# Patient Record
Sex: FEMALE | Race: WHITE | NOT HISPANIC OR LATINO | Employment: FULL TIME | URBAN - METROPOLITAN AREA
[De-identification: names, ages, dates, MRNs, and addresses within clinical notes are randomized per-mention and may not be internally consistent; named-entity substitution may affect disease eponyms.]

---

## 2024-09-05 ENCOUNTER — HOSPITAL ENCOUNTER (INPATIENT)
Facility: HOSPITAL | Age: 34
LOS: 2 days | Discharge: HOME HEALTH | DRG: 494 | End: 2024-09-07
Attending: STUDENT IN AN ORGANIZED HEALTH CARE EDUCATION/TRAINING PROGRAM | Admitting: SURGERY
Payer: MEDICAID

## 2024-09-05 ENCOUNTER — ANESTHESIA EVENT (OUTPATIENT)
Dept: SURGERY | Facility: HOSPITAL | Age: 34
End: 2024-09-05
Payer: MEDICAID

## 2024-09-05 ENCOUNTER — ANESTHESIA (OUTPATIENT)
Dept: SURGERY | Facility: HOSPITAL | Age: 34
End: 2024-09-05
Payer: MEDICAID

## 2024-09-05 DIAGNOSIS — S82.201A CLOSED FRACTURE OF RIGHT TIBIA AND FIBULA, INITIAL ENCOUNTER: Primary | ICD-10-CM

## 2024-09-05 DIAGNOSIS — S82.301A CLOSED FRACTURE OF DISTAL END OF RIGHT TIBIA, UNSPECIFIED FRACTURE MORPHOLOGY, INITIAL ENCOUNTER: ICD-10-CM

## 2024-09-05 DIAGNOSIS — S82.401A CLOSED FRACTURE OF RIGHT TIBIA AND FIBULA, INITIAL ENCOUNTER: Primary | ICD-10-CM

## 2024-09-05 DIAGNOSIS — T14.8XXA FRACTURE: ICD-10-CM
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
## 2024-09-05 LAB
ALBUMIN SERPL-MCNC: 3.6 G/DL (ref 3.5–5)
ALBUMIN/GLOB SERPL: 1 RATIO (ref 1.1–2)
ALP SERPL-CCNC: 75 UNIT/L (ref 40–150)
ALT SERPL-CCNC: 8 UNIT/L (ref 0–55)
ANION GAP SERPL CALC-SCNC: 8 MEQ/L
APTT PPP: 27.3 SECONDS (ref 23.4–33.9)
AST SERPL-CCNC: 12 UNIT/L (ref 5–34)
B-HCG SERPL QL: NEGATIVE
BASOPHILS # BLD AUTO: 0.02 X10(3)/MCL
BASOPHILS NFR BLD AUTO: 0.1 %
BILIRUB SERPL-MCNC: 0.3 MG/DL
BUN SERPL-MCNC: 7.8 MG/DL (ref 7–18.7)
CALCIUM SERPL-MCNC: 8.9 MG/DL (ref 8.4–10.2)
CHLORIDE SERPL-SCNC: 113 MMOL/L (ref 98–107)
CO2 SERPL-SCNC: 21 MMOL/L (ref 22–29)
CREAT SERPL-MCNC: 0.74 MG/DL (ref 0.55–1.02)
CREAT/UREA NIT SERPL: 11
EOSINOPHIL # BLD AUTO: 0 X10(3)/MCL (ref 0–0.9)
EOSINOPHIL NFR BLD AUTO: 0 %
ERYTHROCYTE [DISTWIDTH] IN BLOOD BY AUTOMATED COUNT: 15.6 % (ref 11.5–17)
GFR SERPLBLD CREATININE-BSD FMLA CKD-EPI: >60 ML/MIN/1.73/M2
GLOBULIN SER-MCNC: 3.5 GM/DL (ref 2.4–3.5)
GLUCOSE SERPL-MCNC: 94 MG/DL (ref 74–100)
HCT VFR BLD AUTO: 39.6 % (ref 37–47)
HGB BLD-MCNC: 12.8 G/DL (ref 12–16)
IMM GRANULOCYTES # BLD AUTO: 0.05 X10(3)/MCL (ref 0–0.04)
IMM GRANULOCYTES NFR BLD AUTO: 0.4 %
INR PPP: 1.1 (ref 2–3)
LACTATE SERPL-SCNC: 1.5 MMOL/L (ref 0.5–2.2)
LYMPHOCYTES # BLD AUTO: 1.17 X10(3)/MCL (ref 0.6–4.6)
LYMPHOCYTES NFR BLD AUTO: 8.4 %
MCH RBC QN AUTO: 25.2 PG (ref 27–31)
MCHC RBC AUTO-ENTMCNC: 32.3 G/DL (ref 33–36)
MCV RBC AUTO: 78.1 FL (ref 80–94)
MONOCYTES # BLD AUTO: 0.54 X10(3)/MCL (ref 0.1–1.3)
MONOCYTES NFR BLD AUTO: 3.9 %
NEUTROPHILS # BLD AUTO: 12.08 X10(3)/MCL (ref 2.1–9.2)
NEUTROPHILS NFR BLD AUTO: 87.2 %
NRBC BLD AUTO-RTO: 0 %
PLATELET # BLD AUTO: 317 X10(3)/MCL (ref 130–400)
PMV BLD AUTO: 9.5 FL (ref 7.4–10.4)
POTASSIUM SERPL-SCNC: 3.9 MMOL/L (ref 3.5–5.1)
PROT SERPL-MCNC: 7.1 GM/DL (ref 6.4–8.3)
PROTHROMBIN TIME: 13.9 SECONDS (ref 11.7–14.5)
RBC # BLD AUTO: 5.07 X10(6)/MCL (ref 4.2–5.4)
SODIUM SERPL-SCNC: 142 MMOL/L (ref 136–145)
WBC # BLD AUTO: 13.86 X10(3)/MCL (ref 4.5–11.5)

## 2024-09-05 PROCEDURE — 83605 ASSAY OF LACTIC ACID: CPT

## 2024-09-05 PROCEDURE — 63600175 PHARM REV CODE 636 W HCPCS: Performed by: ANESTHESIOLOGY

## 2024-09-05 PROCEDURE — 99223 1ST HOSP IP/OBS HIGH 75: CPT | Mod: ,,, | Performed by: SURGERY

## 2024-09-05 PROCEDURE — 63600175 PHARM REV CODE 636 W HCPCS: Performed by: ORTHOPAEDIC SURGERY

## 2024-09-05 PROCEDURE — 63600175 PHARM REV CODE 636 W HCPCS: Performed by: NURSE ANESTHETIST, CERTIFIED REGISTERED

## 2024-09-05 PROCEDURE — 96375 TX/PRO/DX INJ NEW DRUG ADDON: CPT

## 2024-09-05 PROCEDURE — 85730 THROMBOPLASTIN TIME PARTIAL: CPT | Performed by: STUDENT IN AN ORGANIZED HEALTH CARE EDUCATION/TRAINING PROGRAM

## 2024-09-05 PROCEDURE — 27759 TREATMENT OF TIBIA FRACTURE: CPT | Mod: AS,RT,, | Performed by: PHYSICIAN ASSISTANT

## 2024-09-05 PROCEDURE — 99285 EMERGENCY DEPT VISIT HI MDM: CPT | Mod: 25

## 2024-09-05 PROCEDURE — 71000039 HC RECOVERY, EACH ADD'L HOUR: Performed by: ORTHOPAEDIC SURGERY

## 2024-09-05 PROCEDURE — 37000009 HC ANESTHESIA EA ADD 15 MINS: Performed by: ORTHOPAEDIC SURGERY

## 2024-09-05 PROCEDURE — 25000003 PHARM REV CODE 250

## 2024-09-05 PROCEDURE — 25000003 PHARM REV CODE 250: Performed by: NURSE ANESTHETIST, CERTIFIED REGISTERED

## 2024-09-05 PROCEDURE — 63600175 PHARM REV CODE 636 W HCPCS: Performed by: STUDENT IN AN ORGANIZED HEALTH CARE EDUCATION/TRAINING PROGRAM

## 2024-09-05 PROCEDURE — 85610 PROTHROMBIN TIME: CPT | Performed by: STUDENT IN AN ORGANIZED HEALTH CARE EDUCATION/TRAINING PROGRAM

## 2024-09-05 PROCEDURE — 25000003 PHARM REV CODE 250: Performed by: STUDENT IN AN ORGANIZED HEALTH CARE EDUCATION/TRAINING PROGRAM

## 2024-09-05 PROCEDURE — 99222 1ST HOSP IP/OBS MODERATE 55: CPT | Mod: 57,,, | Performed by: ORTHOPAEDIC SURGERY

## 2024-09-05 PROCEDURE — 27201423 OPTIME MED/SURG SUP & DEVICES STERILE SUPPLY: Performed by: ORTHOPAEDIC SURGERY

## 2024-09-05 PROCEDURE — 63600175 PHARM REV CODE 636 W HCPCS

## 2024-09-05 PROCEDURE — C1713 ANCHOR/SCREW BN/BN,TIS/BN: HCPCS | Performed by: ORTHOPAEDIC SURGERY

## 2024-09-05 PROCEDURE — 37000008 HC ANESTHESIA 1ST 15 MINUTES: Performed by: ORTHOPAEDIC SURGERY

## 2024-09-05 PROCEDURE — 80053 COMPREHEN METABOLIC PANEL: CPT | Performed by: STUDENT IN AN ORGANIZED HEALTH CARE EDUCATION/TRAINING PROGRAM

## 2024-09-05 PROCEDURE — 11000001 HC ACUTE MED/SURG PRIVATE ROOM

## 2024-09-05 PROCEDURE — 36000711: Performed by: ORTHOPAEDIC SURGERY

## 2024-09-05 PROCEDURE — 63600175 PHARM REV CODE 636 W HCPCS: Performed by: PHYSICIAN ASSISTANT

## 2024-09-05 PROCEDURE — C1769 GUIDE WIRE: HCPCS | Performed by: ORTHOPAEDIC SURGERY

## 2024-09-05 PROCEDURE — 0QSG04Z REPOSITION RIGHT TIBIA WITH INTERNAL FIXATION DEVICE, OPEN APPROACH: ICD-10-PCS | Performed by: ORTHOPAEDIC SURGERY

## 2024-09-05 PROCEDURE — 71000015 HC POSTOP RECOV 1ST HR: Performed by: ORTHOPAEDIC SURGERY

## 2024-09-05 PROCEDURE — 96374 THER/PROPH/DIAG INJ IV PUSH: CPT

## 2024-09-05 PROCEDURE — 27769 OPTX POST ANKLE FX: CPT | Mod: 51,RT,, | Performed by: ORTHOPAEDIC SURGERY

## 2024-09-05 PROCEDURE — 36000710: Performed by: ORTHOPAEDIC SURGERY

## 2024-09-05 PROCEDURE — 84703 CHORIONIC GONADOTROPIN ASSAY: CPT | Performed by: STUDENT IN AN ORGANIZED HEALTH CARE EDUCATION/TRAINING PROGRAM

## 2024-09-05 PROCEDURE — 85025 COMPLETE CBC W/AUTO DIFF WBC: CPT | Performed by: STUDENT IN AN ORGANIZED HEALTH CARE EDUCATION/TRAINING PROGRAM

## 2024-09-05 PROCEDURE — 71000033 HC RECOVERY, INTIAL HOUR: Performed by: ORTHOPAEDIC SURGERY

## 2024-09-05 PROCEDURE — 27759 TREATMENT OF TIBIA FRACTURE: CPT | Mod: RT,,, | Performed by: ORTHOPAEDIC SURGERY

## 2024-09-05 DEVICE — SCREW CORTEX 3.5 38M: Type: IMPLANTABLE DEVICE | Site: TIBIA | Status: FUNCTIONAL

## 2024-09-05 DEVICE — SCREW XL25 IM NAIL 42X5MM: Type: IMPLANTABLE DEVICE | Site: TIBIA | Status: FUNCTIONAL

## 2024-09-05 DEVICE — SCREW XL25 IM NAIL 40X5MM: Type: IMPLANTABLE DEVICE | Site: TIBIA | Status: FUNCTIONAL

## 2024-09-05 DEVICE — SCREW CAN 4.0MMX45MM.: Type: IMPLANTABLE DEVICE | Site: TIBIA | Status: FUNCTIONAL

## 2024-09-05 DEVICE — IMPLANTABLE DEVICE: Type: IMPLANTABLE DEVICE | Site: TIBIA | Status: FUNCTIONAL

## 2024-09-05 DEVICE — PLATE 8-HOLE LCP: Type: IMPLANTABLE DEVICE | Site: TIBIA | Status: FUNCTIONAL

## 2024-09-05 DEVICE — SCREW CORTEX 3.5 X 24: Type: IMPLANTABLE DEVICE | Site: TIBIA | Status: FUNCTIONAL

## 2024-09-05 DEVICE — SCREW LOCKING 3.5MM X 38MM: Type: IMPLANTABLE DEVICE | Site: TIBIA | Status: FUNCTIONAL

## 2024-09-05 DEVICE — SCREW LOK LP 5.0X42MM: Type: IMPLANTABLE DEVICE | Site: TIBIA | Status: FUNCTIONAL

## 2024-09-05 DEVICE — SCREW LOK XL25 5X44MM: Type: IMPLANTABLE DEVICE | Site: TIBIA | Status: FUNCTIONAL

## 2024-09-05 DEVICE — SCREW CORTEX 3.5 X 26: Type: IMPLANTABLE DEVICE | Site: TIBIA | Status: FUNCTIONAL

## 2024-09-05 DEVICE — NAIL IM PEEK TIB 10X345MM: Type: IMPLANTABLE DEVICE | Site: TIBIA | Status: FUNCTIONAL

## 2024-09-05 RX ORDER — POLYETHYLENE GLYCOL 3350 17 G/17G
17 POWDER, FOR SOLUTION ORAL DAILY
Status: DISCONTINUED | OUTPATIENT
Start: 2024-09-06 | End: 2024-09-07 | Stop reason: HOSPADM

## 2024-09-05 RX ORDER — LIDOCAINE HYDROCHLORIDE 20 MG/ML
INJECTION, SOLUTION EPIDURAL; INFILTRATION; INTRACAUDAL; PERINEURAL
Status: DISCONTINUED | OUTPATIENT
Start: 2024-09-05 | End: 2024-09-05

## 2024-09-05 RX ORDER — FENTANYL CITRATE 50 UG/ML
INJECTION, SOLUTION INTRAMUSCULAR; INTRAVENOUS
Status: DISCONTINUED | OUTPATIENT
Start: 2024-09-05 | End: 2024-09-05

## 2024-09-05 RX ORDER — SODIUM CHLORIDE 9 MG/ML
INJECTION, SOLUTION INTRAVENOUS CONTINUOUS
Status: DISCONTINUED | OUTPATIENT
Start: 2024-09-05 | End: 2024-09-05

## 2024-09-05 RX ORDER — LIDOCAINE HYDROCHLORIDE 10 MG/ML
1 INJECTION, SOLUTION EPIDURAL; INFILTRATION; INTRACAUDAL; PERINEURAL ONCE
Status: DISCONTINUED | OUTPATIENT
Start: 2024-09-05 | End: 2024-09-05 | Stop reason: HOSPADM

## 2024-09-05 RX ORDER — DEXAMETHASONE SODIUM PHOSPHATE 4 MG/ML
INJECTION, SOLUTION INTRA-ARTICULAR; INTRALESIONAL; INTRAMUSCULAR; INTRAVENOUS; SOFT TISSUE
Status: DISCONTINUED | OUTPATIENT
Start: 2024-09-05 | End: 2024-09-05

## 2024-09-05 RX ORDER — ONDANSETRON HYDROCHLORIDE 2 MG/ML
4 INJECTION, SOLUTION INTRAVENOUS ONCE
Status: COMPLETED | OUTPATIENT
Start: 2024-09-05 | End: 2024-09-05

## 2024-09-05 RX ORDER — HYDROMORPHONE HYDROCHLORIDE 2 MG/ML
0.5 INJECTION, SOLUTION INTRAMUSCULAR; INTRAVENOUS; SUBCUTANEOUS EVERY 5 MIN PRN
Status: DISCONTINUED | OUTPATIENT
Start: 2024-09-05 | End: 2024-09-05 | Stop reason: HOSPADM

## 2024-09-05 RX ORDER — DOCUSATE SODIUM 100 MG/1
100 CAPSULE, LIQUID FILLED ORAL 2 TIMES DAILY
Status: DISCONTINUED | OUTPATIENT
Start: 2024-09-05 | End: 2024-09-07 | Stop reason: HOSPADM

## 2024-09-05 RX ORDER — ACETAMINOPHEN 10 MG/ML
INJECTION, SOLUTION INTRAVENOUS
Status: DISCONTINUED | OUTPATIENT
Start: 2024-09-05 | End: 2024-09-05

## 2024-09-05 RX ORDER — PROCHLORPERAZINE EDISYLATE 5 MG/ML
5 INJECTION INTRAMUSCULAR; INTRAVENOUS ONCE
Status: COMPLETED | OUTPATIENT
Start: 2024-09-05 | End: 2024-09-05

## 2024-09-05 RX ORDER — MEPERIDINE HYDROCHLORIDE 25 MG/ML
12.5 INJECTION INTRAMUSCULAR; INTRAVENOUS; SUBCUTANEOUS ONCE
Status: COMPLETED | OUTPATIENT
Start: 2024-09-05 | End: 2024-09-05

## 2024-09-05 RX ORDER — ACETAMINOPHEN 325 MG/1
650 TABLET ORAL EVERY 4 HOURS
Status: DISCONTINUED | OUTPATIENT
Start: 2024-09-05 | End: 2024-09-07 | Stop reason: HOSPADM

## 2024-09-05 RX ORDER — MORPHINE SULFATE 4 MG/ML
4 INJECTION, SOLUTION INTRAMUSCULAR; INTRAVENOUS
Status: COMPLETED | OUTPATIENT
Start: 2024-09-05 | End: 2024-09-05

## 2024-09-05 RX ORDER — SODIUM CHLORIDE 9 MG/ML
1000 INJECTION, SOLUTION INTRAVENOUS
Status: COMPLETED | OUTPATIENT
Start: 2024-09-05 | End: 2024-09-05

## 2024-09-05 RX ORDER — ROCURONIUM BROMIDE 10 MG/ML
INJECTION, SOLUTION INTRAVENOUS
Status: DISCONTINUED | OUTPATIENT
Start: 2024-09-05 | End: 2024-09-05

## 2024-09-05 RX ORDER — POLYETHYLENE GLYCOL 3350 17 G/17G
17 POWDER, FOR SOLUTION ORAL 2 TIMES DAILY
Status: DISCONTINUED | OUTPATIENT
Start: 2024-09-05 | End: 2024-09-07 | Stop reason: HOSPADM

## 2024-09-05 RX ORDER — CEFAZOLIN SODIUM 2 G/50ML
2 SOLUTION INTRAVENOUS
Status: COMPLETED | OUTPATIENT
Start: 2024-09-05 | End: 2024-09-06

## 2024-09-05 RX ORDER — SUCCINYLCHOLINE CHLORIDE 20 MG/ML
INJECTION INTRAMUSCULAR; INTRAVENOUS
Status: DISCONTINUED | OUTPATIENT
Start: 2024-09-05 | End: 2024-09-05

## 2024-09-05 RX ORDER — TALC
6 POWDER (GRAM) TOPICAL NIGHTLY PRN
Status: DISCONTINUED | OUTPATIENT
Start: 2024-09-05 | End: 2024-09-07 | Stop reason: HOSPADM

## 2024-09-05 RX ORDER — PROPOFOL 10 MG/ML
VIAL (ML) INTRAVENOUS
Status: DISCONTINUED | OUTPATIENT
Start: 2024-09-05 | End: 2024-09-05

## 2024-09-05 RX ORDER — GABAPENTIN 300 MG/1
300 CAPSULE ORAL 3 TIMES DAILY
Status: DISCONTINUED | OUTPATIENT
Start: 2024-09-05 | End: 2024-09-07 | Stop reason: HOSPADM

## 2024-09-05 RX ORDER — FAMOTIDINE 10 MG/ML
20 INJECTION INTRAVENOUS ONCE
Status: DISCONTINUED | OUTPATIENT
Start: 2024-09-05 | End: 2024-09-05 | Stop reason: HOSPADM

## 2024-09-05 RX ORDER — DIPHENHYDRAMINE HYDROCHLORIDE 50 MG/ML
25 INJECTION INTRAMUSCULAR; INTRAVENOUS EVERY 6 HOURS PRN
Status: DISCONTINUED | OUTPATIENT
Start: 2024-09-05 | End: 2024-09-05 | Stop reason: HOSPADM

## 2024-09-05 RX ORDER — FENTANYL CITRATE 50 UG/ML
100 INJECTION, SOLUTION INTRAMUSCULAR; INTRAVENOUS
Status: COMPLETED | OUTPATIENT
Start: 2024-09-05 | End: 2024-09-05

## 2024-09-05 RX ORDER — OXYCODONE HYDROCHLORIDE 10 MG/1
10 TABLET ORAL EVERY 4 HOURS PRN
Status: DISCONTINUED | OUTPATIENT
Start: 2024-09-05 | End: 2024-09-07 | Stop reason: HOSPADM

## 2024-09-05 RX ORDER — MORPHINE SULFATE 4 MG/ML
4 INJECTION, SOLUTION INTRAMUSCULAR; INTRAVENOUS EVERY 6 HOURS PRN
Status: DISCONTINUED | OUTPATIENT
Start: 2024-09-05 | End: 2024-09-07 | Stop reason: HOSPADM

## 2024-09-05 RX ORDER — ENOXAPARIN SODIUM 100 MG/ML
40 INJECTION SUBCUTANEOUS EVERY 12 HOURS
Status: DISCONTINUED | OUTPATIENT
Start: 2024-09-05 | End: 2024-09-07 | Stop reason: HOSPADM

## 2024-09-05 RX ORDER — OXYCODONE HYDROCHLORIDE 5 MG/1
5 TABLET ORAL EVERY 4 HOURS PRN
Status: DISCONTINUED | OUTPATIENT
Start: 2024-09-05 | End: 2024-09-07 | Stop reason: HOSPADM

## 2024-09-05 RX ORDER — METOCLOPRAMIDE HYDROCHLORIDE 5 MG/ML
10 INJECTION INTRAMUSCULAR; INTRAVENOUS ONCE
Status: DISCONTINUED | OUTPATIENT
Start: 2024-09-05 | End: 2024-09-05 | Stop reason: HOSPADM

## 2024-09-05 RX ORDER — ONDANSETRON HYDROCHLORIDE 2 MG/ML
4 INJECTION, SOLUTION INTRAVENOUS
Status: COMPLETED | OUTPATIENT
Start: 2024-09-05 | End: 2024-09-05

## 2024-09-05 RX ORDER — METHOCARBAMOL 500 MG/1
500 TABLET, FILM COATED ORAL EVERY 8 HOURS
Status: DISCONTINUED | OUTPATIENT
Start: 2024-09-05 | End: 2024-09-07 | Stop reason: HOSPADM

## 2024-09-05 RX ORDER — IPRATROPIUM BROMIDE AND ALBUTEROL SULFATE 2.5; .5 MG/3ML; MG/3ML
3 SOLUTION RESPIRATORY (INHALATION) ONCE AS NEEDED
Status: DISCONTINUED | OUTPATIENT
Start: 2024-09-05 | End: 2024-09-05 | Stop reason: HOSPADM

## 2024-09-05 RX ORDER — ONDANSETRON HYDROCHLORIDE 2 MG/ML
INJECTION, SOLUTION INTRAVENOUS
Status: DISCONTINUED | OUTPATIENT
Start: 2024-09-05 | End: 2024-09-05

## 2024-09-05 RX ORDER — METOCLOPRAMIDE HYDROCHLORIDE 5 MG/ML
10 INJECTION INTRAMUSCULAR; INTRAVENOUS EVERY 10 MIN PRN
Status: COMPLETED | OUTPATIENT
Start: 2024-09-05 | End: 2024-09-05

## 2024-09-05 RX ORDER — MIDAZOLAM HYDROCHLORIDE 2 MG/2ML
2 INJECTION, SOLUTION INTRAMUSCULAR; INTRAVENOUS ONCE AS NEEDED
Status: DISCONTINUED | OUTPATIENT
Start: 2024-09-05 | End: 2024-09-05 | Stop reason: HOSPADM

## 2024-09-05 RX ORDER — HYDROMORPHONE HYDROCHLORIDE 2 MG/ML
INJECTION, SOLUTION INTRAMUSCULAR; INTRAVENOUS; SUBCUTANEOUS
Status: DISCONTINUED | OUTPATIENT
Start: 2024-09-05 | End: 2024-09-05

## 2024-09-05 RX ORDER — CEFAZOLIN SODIUM 2 G/50ML
2 SOLUTION INTRAVENOUS
Status: DISCONTINUED | OUTPATIENT
Start: 2024-09-05 | End: 2024-09-05 | Stop reason: HOSPADM

## 2024-09-05 RX ORDER — SODIUM CHLORIDE, SODIUM LACTATE, POTASSIUM CHLORIDE, CALCIUM CHLORIDE 600; 310; 30; 20 MG/100ML; MG/100ML; MG/100ML; MG/100ML
INJECTION, SOLUTION INTRAVENOUS CONTINUOUS
Status: DISCONTINUED | OUTPATIENT
Start: 2024-09-05 | End: 2024-09-05

## 2024-09-05 RX ORDER — VANCOMYCIN HYDROCHLORIDE 1 G/20ML
INJECTION, POWDER, LYOPHILIZED, FOR SOLUTION INTRAVENOUS
Status: DISCONTINUED | OUTPATIENT
Start: 2024-09-05 | End: 2024-09-05 | Stop reason: HOSPADM

## 2024-09-05 RX ORDER — DEXMEDETOMIDINE HYDROCHLORIDE 100 UG/ML
INJECTION, SOLUTION INTRAVENOUS
Status: DISCONTINUED | OUTPATIENT
Start: 2024-09-05 | End: 2024-09-05

## 2024-09-05 RX ORDER — METHOCARBAMOL 100 MG/ML
1000 INJECTION, SOLUTION INTRAMUSCULAR; INTRAVENOUS ONCE
Status: COMPLETED | OUTPATIENT
Start: 2024-09-05 | End: 2024-09-05

## 2024-09-05 RX ORDER — ADHESIVE BANDAGE
30 BANDAGE TOPICAL DAILY PRN
Status: DISCONTINUED | OUTPATIENT
Start: 2024-09-05 | End: 2024-09-07 | Stop reason: HOSPADM

## 2024-09-05 RX ORDER — MIDAZOLAM HYDROCHLORIDE 1 MG/ML
INJECTION INTRAMUSCULAR; INTRAVENOUS
Status: DISCONTINUED | OUTPATIENT
Start: 2024-09-05 | End: 2024-09-05

## 2024-09-05 RX ORDER — ACETAMINOPHEN 500 MG
1000 TABLET ORAL ONCE
Status: DISCONTINUED | OUTPATIENT
Start: 2024-09-05 | End: 2024-09-05 | Stop reason: HOSPADM

## 2024-09-05 RX ORDER — ONDANSETRON HYDROCHLORIDE 2 MG/ML
4 INJECTION, SOLUTION INTRAVENOUS EVERY 6 HOURS PRN
Status: DISCONTINUED | OUTPATIENT
Start: 2024-09-05 | End: 2024-09-07 | Stop reason: HOSPADM

## 2024-09-05 RX ORDER — FENTANYL CITRATE 50 UG/ML
50 INJECTION, SOLUTION INTRAMUSCULAR; INTRAVENOUS
Status: COMPLETED | OUTPATIENT
Start: 2024-09-05 | End: 2024-09-05

## 2024-09-05 RX ORDER — HYDROMORPHONE HYDROCHLORIDE 2 MG/ML
0.2 INJECTION, SOLUTION INTRAMUSCULAR; INTRAVENOUS; SUBCUTANEOUS EVERY 5 MIN PRN
Status: DISCONTINUED | OUTPATIENT
Start: 2024-09-05 | End: 2024-09-05 | Stop reason: HOSPADM

## 2024-09-05 RX ORDER — HYDROCODONE BITARTRATE AND ACETAMINOPHEN 5; 325 MG/1; MG/1
1 TABLET ORAL
Status: DISCONTINUED | OUTPATIENT
Start: 2024-09-05 | End: 2024-09-05 | Stop reason: HOSPADM

## 2024-09-05 RX ADMIN — SUCCINYLCHOLINE CHLORIDE 140 MG: 20 INJECTION, SOLUTION INTRAMUSCULAR; INTRAVENOUS at 02:09

## 2024-09-05 RX ADMIN — HYDROMORPHONE HYDROCHLORIDE 0.5 MG: 2 INJECTION, SOLUTION INTRAMUSCULAR; INTRAVENOUS; SUBCUTANEOUS at 04:09

## 2024-09-05 RX ADMIN — MEPERIDINE HYDROCHLORIDE 12.5 MG: 25 INJECTION INTRAMUSCULAR; INTRAVENOUS; SUBCUTANEOUS at 04:09

## 2024-09-05 RX ADMIN — ACETAMINOPHEN 1000 MG: 10 INJECTION, SOLUTION INTRAVENOUS at 02:09

## 2024-09-05 RX ADMIN — SODIUM CHLORIDE, SODIUM GLUCONATE, SODIUM ACETATE, POTASSIUM CHLORIDE AND MAGNESIUM CHLORIDE: 526; 502; 368; 37; 30 INJECTION, SOLUTION INTRAVENOUS at 04:09

## 2024-09-05 RX ADMIN — GABAPENTIN 300 MG: 300 CAPSULE ORAL at 09:09

## 2024-09-05 RX ADMIN — CEFAZOLIN SODIUM 2 G: 2 SOLUTION INTRAVENOUS at 10:09

## 2024-09-05 RX ADMIN — DEXAMETHASONE SODIUM PHOSPHATE 8 MG: 4 INJECTION, SOLUTION INTRA-ARTICULAR; INTRALESIONAL; INTRAMUSCULAR; INTRAVENOUS; SOFT TISSUE at 02:09

## 2024-09-05 RX ADMIN — SODIUM CHLORIDE, SODIUM GLUCONATE, SODIUM ACETATE, POTASSIUM CHLORIDE AND MAGNESIUM CHLORIDE: 526; 502; 368; 37; 30 INJECTION, SOLUTION INTRAVENOUS at 03:09

## 2024-09-05 RX ADMIN — HYDROMORPHONE HYDROCHLORIDE 1 MG: 2 INJECTION, SOLUTION INTRAMUSCULAR; INTRAVENOUS; SUBCUTANEOUS at 02:09

## 2024-09-05 RX ADMIN — ONDANSETRON 4 MG: 2 INJECTION INTRAMUSCULAR; INTRAVENOUS at 04:09

## 2024-09-05 RX ADMIN — MORPHINE SULFATE 4 MG: 4 INJECTION INTRAVENOUS at 10:09

## 2024-09-05 RX ADMIN — DOCUSATE SODIUM 100 MG: 100 CAPSULE, LIQUID FILLED ORAL at 09:09

## 2024-09-05 RX ADMIN — SODIUM CHLORIDE, SODIUM GLUCONATE, SODIUM ACETATE, POTASSIUM CHLORIDE AND MAGNESIUM CHLORIDE: 526; 502; 368; 37; 30 INJECTION, SOLUTION INTRAVENOUS at 02:09

## 2024-09-05 RX ADMIN — FENTANYL CITRATE 100 MCG: 50 INJECTION, SOLUTION INTRAMUSCULAR; INTRAVENOUS at 02:09

## 2024-09-05 RX ADMIN — METHOCARBAMOL 500 MG: 500 TABLET ORAL at 09:09

## 2024-09-05 RX ADMIN — DEXMEDETOMIDINE 0.2 MCG: 200 INJECTION, SOLUTION INTRAVENOUS at 02:09

## 2024-09-05 RX ADMIN — FENTANYL CITRATE 100 MCG: 50 INJECTION, SOLUTION INTRAMUSCULAR; INTRAVENOUS at 01:09

## 2024-09-05 RX ADMIN — DEXMEDETOMIDINE 0.2 MCG: 200 INJECTION, SOLUTION INTRAVENOUS at 03:09

## 2024-09-05 RX ADMIN — ONDANSETRON 4 MG: 2 INJECTION INTRAMUSCULAR; INTRAVENOUS at 11:09

## 2024-09-05 RX ADMIN — ROCURONIUM BROMIDE 5 MG: 10 SOLUTION INTRAVENOUS at 02:09

## 2024-09-05 RX ADMIN — LIDOCAINE HYDROCHLORIDE 4 ML: 20 INJECTION, SOLUTION INTRAVENOUS at 02:09

## 2024-09-05 RX ADMIN — METHOCARBAMOL 1000 MG: 100 INJECTION INTRAMUSCULAR; INTRAVENOUS at 02:09

## 2024-09-05 RX ADMIN — ONDANSETRON 4 MG: 2 INJECTION INTRAMUSCULAR; INTRAVENOUS at 03:09

## 2024-09-05 RX ADMIN — PROPOFOL 220 MG: 10 INJECTION, EMULSION INTRAVENOUS at 02:09

## 2024-09-05 RX ADMIN — MIDAZOLAM HYDROCHLORIDE 2 MG: 1 INJECTION, SOLUTION INTRAMUSCULAR; INTRAVENOUS at 02:09

## 2024-09-05 RX ADMIN — METOCLOPRAMIDE 10 MG: 5 INJECTION, SOLUTION INTRAMUSCULAR; INTRAVENOUS at 05:09

## 2024-09-05 RX ADMIN — DEXMEDETOMIDINE 0.4 MCG: 200 INJECTION, SOLUTION INTRAVENOUS at 03:09

## 2024-09-05 RX ADMIN — ENOXAPARIN SODIUM 40 MG: 40 INJECTION SUBCUTANEOUS at 09:09

## 2024-09-05 RX ADMIN — PROCHLORPERAZINE EDISYLATE 5 MG: 5 INJECTION INTRAMUSCULAR; INTRAVENOUS at 08:09

## 2024-09-05 RX ADMIN — ROCURONIUM BROMIDE 30 MG: 10 SOLUTION INTRAVENOUS at 03:09

## 2024-09-05 RX ADMIN — HYDROMORPHONE HYDROCHLORIDE 0.5 MG: 2 INJECTION, SOLUTION INTRAMUSCULAR; INTRAVENOUS; SUBCUTANEOUS at 03:09

## 2024-09-05 RX ADMIN — OXYCODONE HYDROCHLORIDE 5 MG: 5 TABLET ORAL at 09:09

## 2024-09-05 RX ADMIN — SUGAMMADEX 200 MG: 100 INJECTION, SOLUTION INTRAVENOUS at 02:09

## 2024-09-05 RX ADMIN — FENTANYL CITRATE 50 MCG: 50 INJECTION, SOLUTION INTRAMUSCULAR; INTRAVENOUS at 09:09

## 2024-09-05 RX ADMIN — CEFAZOLIN SODIUM 2 G: 2 SOLUTION INTRAVENOUS at 02:09

## 2024-09-05 RX ADMIN — ROCURONIUM BROMIDE 60 MG: 10 SOLUTION INTRAVENOUS at 02:09

## 2024-09-05 RX ADMIN — SODIUM CHLORIDE 1000 ML: 9 INJECTION, SOLUTION INTRAVENOUS at 01:09

## 2024-09-05 RX ADMIN — DEXMEDETOMIDINE 0.4 MCG: 200 INJECTION, SOLUTION INTRAVENOUS at 02:09

## 2024-09-05 RX ADMIN — ACETAMINOPHEN 650 MG: 325 TABLET, FILM COATED ORAL at 09:09

## 2024-09-05 RX ADMIN — ONDANSETRON 4 MG: 2 INJECTION INTRAMUSCULAR; INTRAVENOUS at 01:09

## 2024-09-05 NOTE — ED PROVIDER NOTES
Encounter Date: 9/5/2024       History     Chief Complaint   Patient presents with    Fall     c/o right knee and leg pain with knee crepitus s/p fall down porch steps 20 min PTA. Zofran 4 mg and Fent anyl 200 mcg IV given by EMS     HPI    33-year-old female presents emergency department after a slip fall.  Patient states that she was walking outside down her steps when she slipped on the wet steps up of her porch causing pain to her right knee and ankle.  Patient states that her leg went backwards.  She states that the most of the pain is located to the right lower leg below-the-knee.  Has not tried to bear weight.  EMS gave 200 mcg of fentanyl with improvement in pain.    Review of patient's allergies indicates:  No Known Allergies  History reviewed. No pertinent past medical history.  History reviewed. No pertinent surgical history.  No family history on file.  Social History     Tobacco Use    Smoking status: Never    Smokeless tobacco: Never   Substance Use Topics    Alcohol use: Not Currently    Drug use: Never     Review of Systems   Constitutional:  Negative for fever.   Respiratory:  Negative for cough.    Cardiovascular:  Negative for chest pain.   Gastrointestinal:  Negative for abdominal pain, constipation, diarrhea, nausea and vomiting.   Musculoskeletal:  Positive for arthralgias.   Neurological:  Negative for headaches.   All other systems reviewed and are negative.      Physical Exam     Initial Vitals   BP Pulse Resp Temp SpO2   09/05/24 0826 09/05/24 0826 09/05/24 0826 09/05/24 0824 09/05/24 0824   (!) 157/88 100 20 98.6 °F (37 °C) 98 %      MAP       --                Physical Exam    Nursing note and vitals reviewed.  Constitutional: She appears well-developed and well-nourished. No distress.   Cardiovascular:  Normal rate and regular rhythm.           Pulmonary/Chest: Breath sounds normal. No respiratory distress. She has no wheezes. She has no rhonchi. She has no rales.   Abdominal: Abdomen is  soft. There is no abdominal tenderness. There is no rebound and no guarding.   Musculoskeletal:      Right knee: Swelling, effusion and crepitus present. No deformity. Decreased range of motion. Tenderness present.      Left knee: Normal.      Right ankle: Swelling and ecchymosis present. Tenderness present over the lateral malleolus and medial malleolus. Decreased range of motion.      Left ankle: Normal.     Neurological: She is alert and oriented to person, place, and time. She has normal strength.   Skin: Skin is warm. Capillary refill takes less than 2 seconds.         ED Course   Orthopedic Injury    Date/Time: 9/5/2024 9:22 AM    Performed by: Rommel Belcher MD  Authorized by: Rommel Belcher MD    Location procedure was performed:  [facility] EMERGENCY DEPARTMENT  Consent Done?:  Yes  Universal Protocol:     Verbal consent obtained?: Yes      Risks and benefits: Risks, benefits and alternatives were discussed      Consent given by:  Patient  Injury:     Injury location:  Lower leg    Location details:  Right lower leg    Injury type:  Fracture    Fracture type: tibial and fibular shafts        Pre-procedure assessment:     Neurovascular status: Neurovascularly intact      Distal perfusion: normal      Neurological function: normal      Range of motion: reduced      Local anesthesia used?: No      Patient sedated?: No (Fentanyl was given for anesthesia)        Selections made in this section will also lock the Injury type section above.:     Manipulation performed?: Yes      Skeletal traction used?: Yes      Immobilization:  Splint    Splint type: Long leg with stirrup.    Supplies used:  Elastic bandage, cotton padding and Ortho-Glass    Complications: No    Post-procedure assessment:     Neurovascular status: Neurovascularly intact      Distal perfusion: normal      Neurological function: normal      Range of motion: splinted      Patient tolerance:  Patient tolerated the procedure well with no immediate  complications    Labs Reviewed   PREGNANCY TEST, URINE RAPID   APTT   CBC W/ AUTO DIFFERENTIAL    Narrative:     The following orders were created for panel order CBC auto differential.  Procedure                               Abnormality         Status                     ---------                               -----------         ------                     CBC with Differential[[id number]]                                                        Please view results for these tests on the individual orders.   PROTIME-INR   COMPREHENSIVE METABOLIC PANEL   CBC WITH DIFFERENTIAL   HCG, SERUM, QUALITATIVE          Imaging Results              X-Ray Tibia Fibula 2 View Right (Final result)  Result time 09/05/24 10:06:02      Final result by Rohan Hdez MD (09/05/24 10:06:02)                   Impression:      As above.      Electronically signed by: Rohan Hdez  Date:    09/05/2024  Time:    10:06               Narrative:    EXAMINATION:  XR TIBIA FIBULA 2 VIEW RIGHT    CLINICAL HISTORY:  Other injury of unspecified body region, initial encounter    COMPARISON:  Earlier today    FINDINGS:  Two views right tibia and fibula.  Fractures of the tibia and fibula slightly improved position and alignment                                       X-Ray Ankle Complete Right (In process)                      X-Ray Tibia Fibula 2 View Right (In process)  Result time 09/05/24 10:56:10                     X-Ray Knee 1 or 2 View Right (Final result)  Result time 09/05/24 10:21:58   Procedure changed from X-Ray Knee Complete 4 Or More Views Right     Final result by Kennedy Collins MD (09/05/24 10:21:58)                   Impression:      No acute osseous abnormality identified.      Electronically signed by: Kennedy Collins  Date:    09/05/2024  Time:    10:21               Narrative:    EXAMINATION:  XR KNEE 1 OR 2 VIEW RIGHT    CLINICAL HISTORY:  pain;Unspecified fall, initial encounter    TECHNIQUE:  Two-view    COMPARISON:  None  available.    FINDINGS:  The osseous and articular surfaces are unremarkable.  There is no acute fracture, dislocation or arthritic change.  Position and alignment are satisfactory.  There is unremarkable mineralization of the bones.  No soft tissue calcifications identified.                                       Medications   fentaNYL injection 50 mcg (50 mcg Intravenous Given 9/5/24 0904)   morphine injection 4 mg (4 mg Intravenous Given 9/5/24 1041)     Medical Decision Making  Initial Assessment:   Fall    Differential Diagnosis:   Judging by the patient's chief complaint and pertinent history, the patient has the following possible differential diagnoses, including but not limited to the following.  Some of these are deemed to be lower likelihood and some more likely based on my physical exam and history combined with possible lab work and/or imaging studies.   Please see the pertinent studies, and refer to the HPI.  Some of these diagnoses will take further evaluation to fully rule out, perhaps as an outpatient and the patient was encouraged to follow up when discharged for more comprehensive evaluation.      Fall, contusion, fracture, internal knee derangement,  as well as multiple other possible etiologies      Problems Addressed:  Closed fracture of right tibia and fibula, initial encounter: acute illness or injury    Amount and/or Complexity of Data Reviewed  Labs: ordered.  Radiology: ordered.    Risk  Prescription drug management.               ED Course as of 09/05/24 1057   Thu Sep 05, 2024   1041 Ortho requesting admit for surgery [BS]   1047 Ortho requesting CT of the ankle [BS]   1050 Trauma requesting ER to ER transfer [BS]   1056 Dr. Perez ER physician, accepts patient to [facility] for trauma eval [BS]      ED Course User Index  [BS] Rommel Belcher MD                           Clinical Impression:  Final diagnoses:  [W19.XXXA] Fall  [T14.8XXA] Fracture  [S82.201A, S82.401A] Closed  fracture of right tibia and fibula, initial encounter (Primary)          ED Disposition Condition    Transfer to Another Facility Stable                Rommel Belcher MD  09/05/24 6763

## 2024-09-05 NOTE — ANESTHESIA PROCEDURE NOTES
Intubation    Date/Time: 9/5/2024 2:26 PM    Performed by: Marco Bailey CRNA  Authorized by: Anam Womack DO    Intubation:     Induction:  Intravenous    Intubated:  Postinduction    Mask Ventilation:  Easy with oral airway    Attempts:  1    Attempted By:  Student (Arsen Lugo, BRANDO)    Method of Intubation:  Direct    Blade:  Pratima 4    Laryngeal View Grade: Grade I - full view of cords      Difficult Airway Encountered?: No      Complications:  None    Airway Device:  Oral endotracheal tube    Airway Device Size:  7.5    Style/Cuff Inflation:  Cuffed (inflated to minimal occlusive pressure)    Inflation Amount (mL):  6    Tube secured:  21    Secured at:  The lips    Placement Verified By:  Capnometry    Complicating Factors:  None    Findings Post-Intubation:  BS equal bilateral and atraumatic/condition of teeth unchanged

## 2024-09-05 NOTE — OP NOTE
OPERATIVE REPORT    Patient: Faith Lee   : 1990    MRN: [id number]  Date: 2024      Surgeon:Yifan Underwood DO  Assistant: Aj Machado was essential, part of the procedure including deep hardware placement and deep closure.  No senior assistant was availible  Preoperative Diagnosis:  Right distal tibia shaft fracture, right proximal fibula shaft fracture, right posterior malleolus fracture  Postoperative Diagnosis: Same  Procedure:  1) open reduction internal fixation right distal tibia fracture-CPT 84785  2) open reduction internal fixation right posterior malleolus fracture-CPT 39984  Anesthesiologist: Eugenio Gar MD  OR Staff: Circulator: Georgi Smiley RN  Physician Assistant: Mlii Machado PA-C  Radiology Technologist: Estefani Corea RT  Scrub Person: Rusty Sun  Implants:   Implant Name Type Inv. Item Serial No.  Lot No. LRB No. Used Action   JESIKA REAM BALL TP [id number] - [id number]  JESIKA REAM BALL TP [id number]  [employer] [id number] Right 1 Implanted   PROTECTION SLEEVE LONG     [id number] Right 1 Implanted and Explanted   NAIL IM PEEK TIB [id number] - [id number]  NAIL IM PEEK TIB [id number]  [employer] [id number] Right 1 Implanted   42 MM CANNULATED SCREW      Right 1 Implanted   44 MM CANNULATED SCREW      Right 1 Implanted   GUIDEWIRE ORTHOPEDIC 220X1.6MM - [id number]  GUIDEWIRE ORTHOPEDIC 220X1.6MM  [employer]  Right 2 Implanted and Explanted   SCREW XL25 IM NAIL 40X5MM - [id number]  SCREW XL25 IM NAIL 40X5MM  [employer] [id number] Right 1 Implanted   SCREW XL25 IM NAIL 42X5MM - [id number]  SCREW XL25 IM NAIL 42X5MM  [employer] [id number] Right 1 Implanted   WIRE GUIDE 3.2MM 400MM - [id number]  WIRE GUIDE 3.2MM 400MM  [employer]  Right 1 Implanted and Explanted   SCREW EMILIE LP 5.0X42MM - [id number]  SCREW EMILIE LP 5.0X42MM  [employer] [id number] Right 1 Implanted   SCREW EMILIE XL25 5X44MM - [id number]  SCREW EMILIE XL25 5X44MM  [employer] [id number] Right  1 Implanted   SCREW LOCKING 3.5MM X 38MM - [id number]  SCREW LOCKING 3.5MM X 38MM  [employer]  Right 1 Implanted   SCREW CORTEX 3.5 X 26 - [id number]  SCREW CORTEX 3.5 X 26  [employer]  Right 1 Implanted   SCREW CORTEX 3.5 38M - [id number]  SCREW CORTEX 3.5 38M  [employer]  Right 1 Implanted   SCREW CAN 4.8HVL05TQ. - [id number]  SCREW CAN 4.8VRT18GU.  [employer]  Right 1 Implanted   SCREW CORTEX 3.5 X 24 - [id number]  SCREW CORTEX 3.5 X 24  [employer]  Right 1 Implanted   SCREW CORTEX LP ST T15 3.5X42 - [id number]  SCREW CORTEX LP ST T15 3.5X42  [employer]  Right 1 Implanted   PLATE 8-HOLE LCP - [id number]  PLATE 8-HOLE LCP  [employer]  Right 1 Implanted     EBL:  100 cc  Complications: None  Disposition: To PACU, stable    Indications: Faith Lee is a 34 y.o. female presenting with the aforementioned injuries. The procedure is indicated to best obtain and maintain stability of the leg while allowing early ROM.  The patient is awake and alert. After thorough discussion of the risks, benefits, expected outcomes, and alternatives to surgical intervention, the patient agreed to proceed with surgical treatment.  Specific risks discussed included, but were not limited to: superficial or deep infection, wound healing complications, DVT/PE, significant bleeding requiring transfusion, damage to named anatomic structures in the immediate area including named neurovascular structures, implant failure, and general risks of anesthesia.  The patient voiced understanding and written as well as verbal consent was obtained by myself prior to the procedure.    Procedure in Detail:  The patient's extremity was marked by me in the preoperative area.  She was taken to the operating room and after satisfactory induction of anesthesia, was positioned supine on a radiolucent table, with a soft bump under the ipsilateral hip. The lower extremity was sterilely prepped and draped in the usual fashion.  Standard time out procedure was performed confirming the  correct surgeon, site, side, patient ID and procedure.      A small longitudinal incision was made superior to the patella. Bovie was used for hemostasis.  The quadriceps tendon was split in-line with its fibers. Soft tissues and the patella were protected, and a guide wire was placed in the appropriate starting position.  Once this position was confirmed with  C-arm  in multiple planes, the wire was advanced into the proximal tibia. The starting reamer was then used through protected soft tissues to create the entry hole over the guide wire.  Next, a long beaded-tip reaming wire was placed into the intramedullary canal.  C-arm images in multiple planes confirmed successful crossing of the fracture site and intramedullary location of this wire in the distal segment.   Made a 5 cm medial incision near the distal aspect of the tibia open reduced the fracture while holding anatomically and placed we then placed a 3.5 LCP plate to buttress the medial side.      Intramedullary length was measured, and the intramedullary canal was sequentially reamed to a final reamer size of 1 mm larger than the final nail. Care was taken to maintain fracture reduction during the reaming process.      A Synthes tibia nail was assembled to the jig, and inserted into the tibia over the guide wire.  C-arm imaging confirmed full insertion of the nail, with no prominence.  Fracture reduction and alignment was well maintained.  This fracture is long oblique.  Therefore,  2 interlocking screws were placed proximally using the jig, and 2 interlocking screws were placed distally using free-hand perfect [place] technique.  All interlocking screws were place through small incisions, blunt dissection, and with neuro-vascular structures protected.     Fibula has a proximal fibula fracture be treated closed means.  My attention is now drawn to the posterior malleolus    Completed open reduction internal fixation of the posterior malleolus fracture  placed 2 K-wires of the anterior aspect of the tibia followed by 4.5 Synthes headless compression screws    We then evaluated the ankle for instability.  Held in a mortise view and external rotation moment was then placed around the ankle shows no obvious widening of the syndesmosis or clear space.    Satisfied length alignment rotation of the fracture of the stability about the ankle we then proceeded to close    The incisions were then irrigated using copious sterile saline and then closed in layered fashion.  The surgical sites were sterilely cleansed and dressed.    The patient was then subsequently extubated and transferred to to PACU in a stable condition.    All sponge and needle counts were correct at the end of the case.  I was present and participated in all aspects of the procedure.    Prognosis:  The patient will be kept NWB on the ipsilateral extremity 8 weeks.  DVT prophylaxis is indicated for this patient and procedure.  The patient is at risk of pain and stiffness and will be allowed immediate AROM of the knee and ankle. Patient may need return to the OR for excisional debridement or washout if infection/skin necrosis is observed.      This note/OR report was created with the assistance of  voice recognition software or phone  dictation.  There may be transcription errors as a result of using this technology however minimal. Effort has been made to assure accuracy of transcription but any obvious errors or omissions should be clarified with the author of the document.       Yifan Underwood,   Orthopedic Trauma Surgery

## 2024-09-05 NOTE — ANESTHESIA PREPROCEDURE EVALUATION
09/05/2024  Faith Lee is a 34 y.o., female  presents to emergency department after a slip fall.  Patient states that she was walking outside down her steps when she slipped on the wet steps up of her porch causing pain to her right knee and ankle.  Patient states that her leg went backwards.  She states that the most of the pain is located to the right lower leg below-the-knee. Minimally displaced comminuted fracture of the distal tibial diametaphysis on CT.    Presents for insertion of IM tatum right tibia.       Pre-op Assessment    I have reviewed the Patient Summary Reports.     I have reviewed the Nursing Notes. I have reviewed the NPO Status.   I have reviewed the Medications.     Review of Systems  Anesthesia Hx:  No problems with previous Anesthesia                Social:  Non-Smoker       Endocrine:        Obesity / BMI > 30    H/H: 12/39  HCG: NEG    Physical Exam  General: Well nourished, Cooperative, Alert and Oriented    Airway:  Mallampati: II   Mouth Opening: Normal  TM Distance: Normal  Tongue: Normal  Neck ROM: Normal ROM    Dental:  Intact    Chest/Lungs:  Clear to auscultation, Normal Respiratory Rate    Heart:  Rate: Normal  Rhythm: Regular Rhythm  Sounds: Normal    Abdomen:  Normal, Soft, Nontender        Anesthesia Plan  Type of Anesthesia, risks & benefits discussed:    Anesthesia Type: Gen ETT  Intra-op Monitoring Plan: Standard ASA Monitors  Post Op Pain Control Plan: multimodal analgesia  Induction:  IV and rapid sequence  Airway Plan: Direct  Informed Consent: Informed consent signed with the Patient and all parties understand the risks and agree with anesthesia plan.  All questions answered.   ASA Score: 2  Day of Surgery Review of History & Physical: H&P Update referred to the surgeon/provider.    Ready For Surgery From Anesthesia Perspective.     .

## 2024-09-05 NOTE — ED PROVIDER NOTES
Encounter Date: 9/5/2024       History     Chief Complaint   Patient presents with    Fall     c/o right knee and leg pain with knee crepitus s/p fall down porch steps 20 min PTA. Zofran 4 mg and Fent anyl 200 mcg IV given by EMS     Faith Lee is a 34 y.o. female with a past medical history of none who presents to the ED for right lower extremity pain after a fall down her porch steps.  Patient was seen at an outside hospital and found to have a fracture of the distal tibia and fibula.  Patient arrives with a splint in place.  Patient reports ongoing 10/10 pain.         Review of patient's allergies indicates:  No Known Allergies  History reviewed. No pertinent past medical history.  History reviewed. No pertinent surgical history.  No family history on file.  Social History     Tobacco Use    Smoking status: Never    Smokeless tobacco: Never   Substance Use Topics    Alcohol use: Not Currently    Drug use: Never     Review of Systems   Constitutional:  Negative for fever.   HENT:  Negative for sore throat.    Respiratory:  Negative for shortness of breath.    Cardiovascular:  Negative for chest pain.   Gastrointestinal:  Negative for nausea.   Genitourinary:  Negative for dysuria.   Musculoskeletal:  Positive for arthralgias. Negative for back pain.   Skin:  Negative for rash.   Neurological:  Negative for weakness.   Hematological:  Does not bruise/bleed easily.       Physical Exam     Initial Vitals   BP Pulse Resp Temp SpO2   09/05/24 0826 09/05/24 0826 09/05/24 0826 09/05/24 0824 09/05/24 0824   (!) 157/88 100 20 98.6 °F (37 °C) 98 %      MAP       --                Physical Exam    Nursing note and vitals reviewed.  Constitutional: She appears well-developed.   HENT:   Head: Atraumatic.   Eyes: EOM are normal.   Neck: Neck supple.   Cardiovascular:  Normal rate and regular rhythm.           Pulmonary/Chest: Breath sounds normal.   Abdominal: Abdomen is soft. Bowel sounds are normal.   Musculoskeletal:          General: Tenderness present.      Cervical back: Neck supple.      Comments: Right lower extremity in the splint, brisk capillary refill, intact sensation     Neurological: She is alert and oriented to person, place, and time. GCS score is 15. GCS eye subscore is 4. GCS verbal subscore is 5. GCS motor subscore is 6.         ED Course   Procedures  Labs Reviewed   PROTIME-INR - Abnormal       Result Value    PT 13.9      INR 1.1 (*)    COMPREHENSIVE METABOLIC PANEL - Abnormal    Sodium 142      Potassium 3.9      Chloride 113 (*)     CO2 21 (*)     Glucose 94      Blood Urea Nitrogen 7.8      Creatinine 0.74      Calcium 8.9      Protein Total 7.1      Albumin 3.6      Globulin 3.5      Albumin/Globulin Ratio 1.0 (*)     Bilirubin Total 0.3      ALP 75      ALT 8      AST 12      eGFR >60      Anion Gap 8.0      BUN/Creatinine Ratio 11     CBC WITH DIFFERENTIAL - Abnormal    WBC 13.86 (*)     RBC 5.07      Hgb 12.8      Hct 39.6      MCV 78.1 (*)     MCH 25.2 (*)     MCHC 32.3 (*)     RDW 15.6      Platelet 317      MPV 9.5      Neut % 87.2      Lymph % 8.4      Mono % 3.9      Eos % 0.0      Basophil % 0.1      Lymph # 1.17      Neut # 12.08 (*)     Mono # 0.54      Eos # 0.00      Baso # 0.02      IG# 0.05 (*)     IG% 0.4      NRBC% 0.0     APTT - Normal    PTT 27.3     HCG, SERUM, QUALITATIVE - Normal    Beta HCG Qual Negative     CBC W/ AUTO DIFFERENTIAL    Narrative:     The following orders were created for panel order CBC auto differential.  Procedure                               Abnormality         Status                     ---------                               -----------         ------                     CBC with Differential[[id number]]       Abnormal            Final result                 Please view results for these tests on the individual orders.   PREGNANCY TEST, URINE RAPID   LACTIC ACID, PLASMA          Imaging Results               CT Ankle (Including Hindfoot) Without Contrast Right (Final  result)  Result time 09/05/24 12:31:00      Final result by Yogi Thornton MD (09/05/24 12:31:00)                   Narrative:    EXAMINATION  CT ANKLE (INCLUDING HINDFOOT) WITHOUT CONTRAST RIGHT    CLINICAL HISTORY  Fracture, ankle;    TECHNIQUE  Non-contrast helical-acquisition CT images of the right ankle were obtained.  Multiplanar reconstructions accomplished by a CT technologist at a separate workstation, pushed to PACS for physician review.    COMPARISON  Radiographs obtained earlier the same day.    FINDINGS  Images were reviewed in bone and soft tissue windows.    Exam quality: adequate for evaluation    Oblique, comminuted fracture with minimal fragment displacement again appreciated through the distal tibial diametaphysis, extending to the posterior malleolus articular surface.  The distal shaft component is displaced posteriorly approximately 4 mm.  No other convincing acute osseous disruption is identified.  Visualized joints are congruent.  There is no periosteal reaction or destructive skeletal lesion.    Diffuse periarticular subcutaneous edema is present, as well as appearance of disorganized hematoma.  No expansile hematoma or drainable fluid collection is identified.  Within limitations of non-contrast CT protocol, there is no convincing focal muscular abnormality or high-grade tendon disruption.  No gross tendon displacement or evidence of fracture entrapment is identified.    IMPRESSION  1. Minimally displaced comminuted fracture of the distal tibial diametaphysis.  Posterior intra-articular extension noted.  2. No other convincing acute osseous disruption.  ==========  This report was flagged in Epic as abnormal.      RADIATION DOSE  Automated tube current modulation, weight-based exposure dosing, and/or iterative reconstruction technique utilized to reach lowest reasonably achievable exposure rate.    DLP: 246 mGy*cm      Electronically signed by: Yogi  Norberto  Date:    09/05/2024  Time:    12:31                                     X-Ray Tibia Fibula 2 View Right (Final result)  Result time 09/05/24 10:06:02      Final result by Rohan Hdez MD (09/05/24 10:06:02)                   Impression:      As above.      Electronically signed by: Rohan Hdez  Date:    09/05/2024  Time:    10:06               Narrative:    EXAMINATION:  XR TIBIA FIBULA 2 VIEW RIGHT    CLINICAL HISTORY:  Other injury of unspecified body region, initial encounter    COMPARISON:  Earlier today    FINDINGS:  Two views right tibia and fibula.  Fractures of the tibia and fibula slightly improved position and alignment                                       X-Ray Ankle Complete Right (Final result)  Result time 09/05/24 11:29:00      Final result by Kim Pierce MD (09/05/24 11:29:00)                   Impression:      Distal tibia fracture.      Electronically signed by: Kim Pierce  Date:    09/05/2024  Time:    11:29               Narrative:    EXAMINATION:  XR ANKLE COMPLETE 3 VIEW RIGHT    CLINICAL HISTORY:  Unspecified fall, initial encounter    TECHNIQUE:  AP, lateral, and oblique images of the right ankle were performed.    COMPARISON:  None.    FINDINGS:  There is an obliquely oriented fracture through the distal metadiaphysis of the tibia with 5 mm posterolateral displacement of the distal fracture fragment.  Nondisplaced fracture plane appears to extend to the posterior malleolus.  Distal tibiofibular alignment is normal.  The ankle mortise is symmetric.    Soft tissue swelling.                                       X-Ray Tibia Fibula 2 View Right (Final result)  Result time 09/05/24 10:56:09      Final result by Kim Pierce MD (09/05/24 10:56:09)                   Impression:      1. Fracture of the proximal diaphysis of the fibula  2. Fracture of the distal metadiaphysis of the tibia.  3. Nondisplaced posterior malleolar fracture.      Electronically signed  by: Kim Pierce  Date:    09/05/2024  Time:    10:56               Narrative:    EXAMINATION:  XR TIBIA FIBULA 2 VIEW RIGHT    CLINICAL HISTORY:  Unspecified fall, initial encounter    TECHNIQUE:  AP and lateral views of the right tibia and fibula were performed.    COMPARISON:  None.    FINDINGS:  Obliquely oriented fracture of the proximal diaphysis of the fibula with 12 mm anterolateral displacement of the distal fracture fragment.  Obliquely oriented fracture of the distal metaphysis of the tibia with approximately 5 mm posterior displacement.  Probable subtle posterior malleolar fracture, nondisplaced.  No true frontal view of the distal tibia and fibula obtained.  This limits evaluation of tibiofibular alignment.    Soft tissue swelling.                                       X-Ray Knee 1 or 2 View Right (Final result)  Result time 09/05/24 10:21:58   Procedure changed from X-Ray Knee Complete 4 Or More Views Right     Final result by Kennedy Collins MD (09/05/24 10:21:58)                   Impression:      No acute osseous abnormality identified.      Electronically signed by: Kennedy Collins  Date:    09/05/2024  Time:    10:21               Narrative:    EXAMINATION:  XR KNEE 1 OR 2 VIEW RIGHT    CLINICAL HISTORY:  pain;Unspecified fall, initial encounter    TECHNIQUE:  Two-view    COMPARISON:  None available.    FINDINGS:  The osseous and articular surfaces are unremarkable.  There is no acute fracture, dislocation or arthritic change.  Position and alignment are satisfactory.  There is unremarkable mineralization of the bones.  No soft tissue calcifications identified.                                       Medications   cefazolin (ANCEF) 2 gram in dextrose 5% 50 mL IVPB (premix) (has no administration in time range)   0.9%  NaCl infusion (has no administration in time range)   LIDOcaine (PF) 10 mg/ml (1%) injection 10 mg (has no administration in time range)   acetaminophen tablet 1,000 mg (has no  administration in time range)   midazolam (PF) (VERSED) 1 mg/mL injection 2 mg (has no administration in time range)   famotidine (PF) injection 20 mg (has no administration in time range)   metoclopramide injection 10 mg (has no administration in time range)   lactated ringers infusion (has no administration in time range)   enoxaparin injection 40 mg (has no administration in time range)   acetaminophen tablet 650 mg (has no administration in time range)   oxyCODONE immediate release tablet 5 mg (has no administration in time range)   oxyCODONE immediate release tablet Tab 10 mg (has no administration in time range)   methocarbamoL tablet 500 mg (has no administration in time range)   gabapentin capsule 300 mg (has no administration in time range)   melatonin tablet 6 mg (has no administration in time range)   polyethylene glycol packet 17 g (17 g Oral Not Given 9/5/24 1400)   docusate sodium capsule 100 mg (has no administration in time range)   magnesium hydroxide 400 mg/5 ml suspension 2,400 mg (has no administration in time range)   fentaNYL injection 50 mcg (50 mcg Intravenous Given 9/5/24 0904)   morphine injection 4 mg (4 mg Intravenous Given 9/5/24 1041)   fentaNYL injection 100 mcg (100 mcg Intravenous Given 9/5/24 1350)   ondansetron injection 4 mg (4 mg Intravenous Given 9/5/24 1350)   0.9%  NaCl infusion (1,000 mLs Intravenous New Bag 9/5/24 1343)   methocarbamoL injection 1,000 mg (1,000 mg Intravenous Given 9/5/24 1404)     Medical Decision Making  Problems Addressed:  Closed fracture of right tibia and fibula, initial encounter: acute illness or injury  Fall: acute illness or injury  Fracture: acute illness or injury    Amount and/or Complexity of Data Reviewed  Labs: ordered.  Radiology: ordered.    Risk  Prescription drug management.  Decision regarding hospitalization.      Differential diagnosis (includes but is not limited to):   Fracture, dislocation, soft tissue injury, neurovascular injury,  compartment syndrome    MDM Narrative  34-year-old female presents as a transfer from outside hospital after a fall.  Patient found to have a right tib-fib fracture.  Patient arrives with a splint in place.  Patient reports ongoing 10/10 pain.  IV fluid rehydration and ongoing pain and nausea control ordered.  Trauma consulted and will admit.  Orthopedic surgery made aware, tentatively planning for operative intervention today.  Continue NPO status.  Patient reports her last oral intake was last night around 8:00 p.m..    Dispo: admit    My independent radiology interpretation: as above  Point of care US (independently performed and interpreted):   Decision rules/clinical scoring:     Sepsis Perfusion Assessment:     Amount and/or Complexity of Data Reviewed  Independent historian: none   Summary of history:   External data reviewed: notes from previous ED visits and notes from clinic visits  Summary of data reviewed: Prior records reviewed  Risk and benefits of testing: discussed   Labs: ordered and reviewed  Radiology: ordered and independent interpretation performed (see above or ED course)  ECG/medicine tests: ordered and independent interpretation performed (see above or ED course)  Discussion of management or test interpretation with external provider(s): discussed with trauma, ortho consultant   Summary of discussion: as above    Risk  Parenteral controlled substances   Drug therapy requiring intense monitoring for toxicity   Decision regarding hospitalization  Shared decision making     Critical Care  none    Data Reviewed/Counseling: I have personally reviewed the patient's vital signs, nursing notes, and other relevant tests, information, and imaging. I had a detailed discussion regarding the historical points, exam findings, and any diagnostic results supporting the discharge diagnosis. I personally performed the history, PE, MDM and procedures as documented above and agree with the scribe's  documentation.    Portions of this note were dictated using voice recognition software. Although it was reviewed for accuracy, some inherent voice recognition errors may have occurred and may be present in this document.             ED Course as of 09/05/24 1420   Thu Sep 05, 2024   1041 Ortho requesting admit for surgery [BS]   1047 Ortho requesting CT of the ankle [BS]   1050 Trauma requesting ER to ER transfer [BS]   1056 Dr. Perez ER physician, accepts patient to [facility] for trauma eval [BS]      ED Course User Index  [BS] Rommel Belcher MD                           Clinical Impression:  Final diagnoses:  [W19.XXXA] Fall  [T14.8XXA] Fracture  [S82.201A, S82.401A] Closed fracture of right tibia and fibula, initial encounter (Primary)          ED Disposition Condition    Admit                 Jack Rashid MD  09/05/24 1424

## 2024-09-05 NOTE — TRANSFER OF CARE
"Anesthesia Transfer of Care Note    Patient: Faith Lee    Procedure(s) Performed: Procedure(s) (LRB):  INSERTION, INTRAMEDULLARY JESIKA, TIBIA (Right)    Patient location: PACU    Anesthesia Type: general    Transport from OR: Transported from OR on room air with adequate spontaneous ventilation    Post pain: adequate analgesia    Post assessment: no apparent anesthetic complications    Post vital signs: stable    Level of consciousness: responds to stimulation    Nausea/Vomiting: no nausea/vomiting    Complications: none    Transfer of care protocol was followed    Last vitals: Visit Vitals  BP (!) 169/92 (BP Location: Right arm, Patient Position: Lying)   Pulse 80   Temp 37.3 °C (99.1 °F) (Oral)   Resp 20   Ht 5' 11" (1.803 m)   Wt 113.4 kg (250 lb)   LMP 08/22/2024   SpO2 100%   Breastfeeding No   BMI 34.87 kg/m²     "

## 2024-09-05 NOTE — ED TRIAGE NOTES
c/o right knee and leg pain with knee crepitus s/p fall down porch steps 20 min PTA. Zofran 4 mg and Fent anyl 200 mcg IV given by EMS

## 2024-09-05 NOTE — ED NOTES
Arrived to [facility]. Tx from [facility] for ortho/trauma. GCS 15 on arrival.

## 2024-09-05 NOTE — CONSULTS
[facility] - Emergency Dept  Orthopedic Trauma  Consult Note    Patient Name: Faith Lee  MRN: [id number]  Admission Date: 9/5/2024  Hospital Length of Stay: 0 days  Attending Provider: Jarret Scott Jr., *  Primary Care Provider: No primary care provider on file.        Inpatient consult to Orthopedic Surgery  Consult performed by: Yifan Underwood DO  Consult ordered by: Rommel Belcher MD        Subjective:         Chief Complaint:   Chief Complaint   Patient presents with    Fall     c/o right knee and leg pain with knee crepitus s/p fall down porch steps 20 min PTA. 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  Patient has severely fractured right distal tibia fracture with a proximal fibula fracture with intra-articular extension of the tibia fracture.  Patient is a nonsmoker.  She has dull achy pain in this area no numbness no tingling.  Works for at home.  No fevers no chills.    History reviewed. No pertinent past medical history.    History reviewed. No pertinent surgical history.    Review of patient's allergies indicates:  No Known Allergies    Current Facility-Administered Medications   Medication    0.9%  NaCl infusion    acetaminophen tablet 1,000 mg    acetaminophen tablet 650 mg    cefazolin (ANCEF) 2 gram in dextrose 5% 50 mL IVPB (premix)    docusate sodium capsule 100 mg    enoxaparin injection 40 mg    famotidine (PF) injection 20 mg    gabapentin capsule 300 mg    lactated ringers infusion    LIDOcaine (PF) 10 mg/ml (1%) injection 10 mg    magnesium hydroxide 400 mg/5 ml suspension 2,400 mg    melatonin tablet 6 mg    methocarbamoL tablet 500 mg    metoclopramide injection 10 mg    midazolam (PF) (VERSED) 1 mg/mL injection 2 mg    oxyCODONE immediate release tablet 5 mg    oxyCODONE immediate release tablet Tab 10 mg    polyethylene glycol packet 17 g     No  "current outpatient medications on file.     Family History    None       Tobacco Use    Smoking status: Never    Smokeless tobacco: Never   Substance and Sexual Activity    Alcohol use: Not Currently    Drug use: Never    Sexual activity: Never       ROS:  Constitutional: Denies fever chills  Eyes: No change in vision  ENT: No ringing or current infections  CV: No chest pain  Resp: No labored breathing  MSK: Pain evident at site of injury located in HPI,   Integ: No signs of abrasions or lacerations  Neuro: No numbness or tingling  Lymphatic: No swelling outside the area of injury   Objective:     Vital Signs (Most Recent):  Temp: 99.1 °F (37.3 °C) (09/05/24 1256)  Pulse: 79 (09/05/24 1403)  Resp: 16 (09/05/24 1403)  BP: (!) 143/82 (09/05/24 1403)  SpO2: 100 % (09/05/24 1403) Vital Signs (24h Range):  Temp:  [98.6 °F (37 °C)-99.1 °F (37.3 °C)] 99.1 °F (37.3 °C)  Pulse:  [] 79  Resp:  [16-20] 16  SpO2:  [98 %-100 %] 100 %  BP: (132-177)/() 143/82     Weight: 113.4 kg (250 lb)  Height: 5' 11" (180.3 cm)  Body mass index is 34.87 kg/m².    No intake or output data in the 24 hours ending 09/05/24 1418    Ortho/SPM Exam  General the patient is alert and oriented x3 no acute distress nontoxic-appearing appropriate affect.    Constitutional: Vital signs are examined and stable.  Resp: No signs of labored breathing                          RLE: -Skin: splint intact No signs of new abrasions or lacerations, no scars           -MSK: : Hip and Knee F/E, EHL/FHL, trength 5/5           -Neuro:  Sensation intact to light touch L3-S1 dermatomes           -Lymphatic: No signs of lymphadenopathy           -CV: Capillary refill is less than 2 seconds. DP/PT pulses  2/4. Compartments soft and compressible.     Significant Labs:  I have reviewed all labs in relation to Orthopedics  Recent Lab Results         09/05/24  1058        Albumin/Globulin Ratio 1.0       Albumin 3.6       ALP 75       ALT 8       Anion Gap 8.0       " PTT 27.3  Comment: For Minimal Heparin Infusion, the goal aPTT 64-85 seconds corresponds to an anti-Xa of 0.3-0.5.    For Low Intensity and High Intensity Heparin, the goal aPTT  seconds corresponds to an anti-Xa of 0.3-0.7       AST 12       Baso # 0.02       Basophil % 0.1       BILIRUBIN TOTAL 0.3       BUN 7.8       BUN/CREAT RATIO 11       Calcium 8.9       Chloride 113       CO2 21       Creatinine 0.74       eGFR >60       Eos # 0.00       Eos % 0.0       Globulin, Total 3.5       Glucose 94       Hematocrit 39.6       Hemoglobin 12.8       Immature Grans (Abs) 0.05       Immature Granulocytes 0.4       INR 1.1       Lymph # 1.17       LYMPH % 8.4       MCH 25.2       MCHC 32.3       MCV 78.1       Mono # 0.54       Mono % 3.9       MPV 9.5       Neut # 12.08       Neut % 87.2       nRBC 0.0       Platelet Count 317       Potassium 3.9       Beta HCG Qual Negative       PROTEIN TOTAL 7.1       PT 13.9       RBC 5.07       RDW 15.6       Sodium 142       WBC 13.86               Significant Imaging: I have reviewed all pertinent imaging results/findings.  CT Ankle (Including Hindfoot) Without Contrast Right    Result Date: 9/5/2024  EXAMINATION CT ANKLE (INCLUDING HINDFOOT) WITHOUT CONTRAST RIGHT CLINICAL HISTORY Fracture, ankle; TECHNIQUE Non-contrast helical-acquisition CT images of the right ankle were obtained.  Multiplanar reconstructions accomplished by a CT technologist at a separate workstation, pushed to PACS for physician review. COMPARISON Radiographs obtained earlier the same day. FINDINGS Images were reviewed in bone and soft tissue windows. Exam quality: adequate for evaluation Oblique, comminuted fracture with minimal fragment displacement again appreciated through the distal tibial diametaphysis, extending to the posterior malleolus articular surface.  The distal shaft component is displaced posteriorly approximately 4 mm.  No other convincing acute osseous disruption is identified.   Visualized joints are congruent.  There is no periosteal reaction or destructive skeletal lesion. Diffuse periarticular subcutaneous edema is present, as well as appearance of disorganized hematoma.  No expansile hematoma or drainable fluid collection is identified.  Within limitations of non-contrast CT protocol, there is no convincing focal muscular abnormality or high-grade tendon disruption.  No gross tendon displacement or evidence of fracture entrapment is identified. IMPRESSION 1. Minimally displaced comminuted fracture of the distal tibial diametaphysis.  Posterior intra-articular extension noted. 2. No other convincing acute osseous disruption. ========== This report was flagged in Epic as abnormal. RADIATION DOSE Automated tube current modulation, weight-based exposure dosing, and/or iterative reconstruction technique utilized to reach lowest reasonably achievable exposure rate. DLP: 246 mGy*cm Electronically signed by: Yogi Thornton Date:    09/05/2024 Time:    12:31    X-Ray Ankle Complete Right    Result Date: 9/5/2024  EXAMINATION: XR ANKLE COMPLETE 3 VIEW RIGHT CLINICAL HISTORY: Unspecified fall, initial encounter TECHNIQUE: AP, lateral, and oblique images of the right ankle were performed. COMPARISON: None. FINDINGS: There is an obliquely oriented fracture through the distal metadiaphysis of the tibia with 5 mm posterolateral displacement of the distal fracture fragment.  Nondisplaced fracture plane appears to extend to the posterior malleolus.  Distal tibiofibular alignment is normal.  The ankle mortise is symmetric. Soft tissue swelling.     Distal tibia fracture. Electronically signed by: Kim Pierce Date:    09/05/2024 Time:    11:29    X-Ray Tibia Fibula 2 View Right    Result Date: 9/5/2024  EXAMINATION: XR TIBIA FIBULA 2 VIEW RIGHT CLINICAL HISTORY: Unspecified fall, initial encounter TECHNIQUE: AP and lateral views of the right tibia and fibula were performed. COMPARISON: None. FINDINGS:  Obliquely oriented fracture of the proximal diaphysis of the fibula with 12 mm anterolateral displacement of the distal fracture fragment.  Obliquely oriented fracture of the distal metaphysis of the tibia with approximately 5 mm posterior displacement.  Probable subtle posterior malleolar fracture, nondisplaced.  No true frontal view of the distal tibia and fibula obtained.  This limits evaluation of tibiofibular alignment. Soft tissue swelling.     1. Fracture of the proximal diaphysis of the fibula 2. Fracture of the distal metadiaphysis of the tibia. 3. Nondisplaced posterior malleolar fracture. Electronically signed by: Kim Pierce Date:    09/05/2024 Time:    10:56    X-Ray Knee 1 or 2 View Right    Result Date: 9/5/2024  EXAMINATION: XR KNEE 1 OR 2 VIEW RIGHT CLINICAL HISTORY: pain;Unspecified fall, initial encounter TECHNIQUE: Two-view COMPARISON: None available. FINDINGS: The osseous and articular surfaces are unremarkable.  There is no acute fracture, dislocation or arthritic change.  Position and alignment are satisfactory.  There is unremarkable mineralization of the bones.  No soft tissue calcifications identified.     No acute osseous abnormality identified. Electronically signed by: Kennedy Collins Date:    09/05/2024 Time:    10:21    X-Ray Tibia Fibula 2 View Right    Result Date: 9/5/2024  EXAMINATION: XR TIBIA FIBULA 2 VIEW RIGHT CLINICAL HISTORY: Other injury of unspecified body region, initial encounter COMPARISON: Earlier today FINDINGS: Two views right tibia and fibula.  Fractures of the tibia and fibula slightly improved position and alignment     As above. Electronically signed by: Rohan Hdez Date:    09/05/2024 Time:    10:06       Assessment/Plan:     Active Diagnoses:    Diagnosis Date Noted POA    PRINCIPAL PROBLEM:  Closed fracture of right distal tibia [S82.301A] 09/05/2024 Yes    Fall down stairs [W10.8XXA] 09/05/2024 Yes      Problems Resolved During this Admission:        Independent Radiology ordered by other provider:   Two views right tibia skeletally mature individual shows a right tibial shaft fracture distal 3rd with comminution and displacement.  Patient also has a proximal 3rd fibula shaft fracture with displacement.  CT scan of the right ankle shows a similar fracture distal 3rd with a posterior intra-articular malleolus fracture.    Pt has acute injury with risk of severe bodily function with their injury to the right ankle.     -Risks included with this type of injury stiffness painful ambulation possible need for further surgery        Patient was a 34-year-old female who works for [employer] service had an injury to the right ankle.  He was diagnosed with a right distal 3rd tibial shaft fracture this is pretty distal and comminuted.  Patient also has a posterior malleolus fracture as well and a high fibula fracture.  We have discussed risks and benefits of operative versus nonoperative treatment she will proceed with the operative fixation right now.  Patient has been NPO.    I explained that surgery and the nature of their condition are not without risks. These include, but are not limited to, bleeding, infection, neurovascular compromise, malunion, nonunion, hardware complications, wound complications, scarring, cosmetic defects, need for later and/or repeated surgeries, avascular necrosis, bone death due to initial trauma, pain, loss of ROM, loss of function, PTOA, deformity, stance/gait and/or functional abnormalities, thromboembolic complications, compartment syndrome, loss of limb, loss of life, anesthetic complications, and other imponderables. I explained that these can occur despite the adequacy of treatments rendered, and that their risks are heightened given the nature of their condition.  I have also discussed the importance not using nicotine products due to the increased risk of infection surgical wound healing complications and nonunion of the fracture.   They verbalized understanding.  No guarantees were made.  They would like to continue with surgery at this time. If appropriate family was involved with surgical discussion.             This note/OR report was created with the assistance of  voice recognition software or phone  dictation.  There may be transcription errors as a result of using this technology however minimal. Effort has been made to assure accuracy of transcription but any obvious errors or omissions should be clarified with the author of the document.       Yifan Underwood,    Orthopedic Trauma Surgery  [facility] - Emergency Dept

## 2024-09-05 NOTE — H&P
Trauma Surgery   History and Physical Note    Patient Name: Faith Lee  YOB: 1990  Date: 09/05/2024 1:51 PM  Date of Admission: 9/5/2024  HD#0  POD#Day of Surgery    PRESENTING HISTORY   Chief Complaint/Reason for Admission: Closed fracture of right distal tibia    History of Present Illness:  Patient is a 34 year old female who was transferred from [facility] and reports she slipped and fell down wet stairs while it was raining. She sustained a right distal tibia fracture and was transferred for Orthopedic intervention.     Review of Systems:  12 point ROS negative except as stated in HPI    PAST HISTORY:   Past medical history:  None    Past surgical history:  History reviewed. No pertinent surgical history.    Family history:  No family history on file.    Social history:  Denies tobacco, alcohol or drug use.   Social History     Socioeconomic History    Marital status: Single   Tobacco Use    Smoking status: Never    Smokeless tobacco: Never   Substance and Sexual Activity    Alcohol use: Not Currently    Drug use: Never    Sexual activity: Never     Social History     Tobacco Use   Smoking Status Never   Smokeless Tobacco Never      Social History     Substance and Sexual Activity   Alcohol Use Not Currently        MEDICATIONS & ALLERGIES:   Allergies: Review of patient's allergies indicates:  No Known Allergies  Home Meds: No current outpatient medications   No current facility-administered medications on file prior to encounter.     No current outpatient medications on file prior to encounter.      No current facility-administered medications on file prior to encounter.     No current outpatient medications on file prior to encounter.     Scheduled Meds:   acetaminophen  1,000 mg Oral Once    acetaminophen  650 mg Oral Q4H    docusate sodium  100 mg Oral BID    enoxparin  40 mg Subcutaneous Q12H    famotidine (PF)  20 mg Intravenous Once    gabapentin  300 mg Oral TID    LIDOcaine (PF) 10 mg/ml  "(1%)  1 mL Intradermal Once    methocarbamoL  1,000 mg Intravenous Once    methocarbamoL  500 mg Oral Q8H    metoclopramide  10 mg Intravenous Once    polyethylene glycol  17 g Oral BID     Continuous Infusions:   0.9% NaCl   Intravenous Continuous        lactated ringers   Intravenous Continuous         PRN Meds:  Current Facility-Administered Medications:     ceFAZolin 2 g/50mL Dextrose IVPB, 2 g, Intravenous, On Call Procedure    magnesium hydroxide 400 mg/5 ml, 30 mL, Oral, Daily PRN    melatonin, 6 mg, Oral, Nightly PRN    midazolam, 2 mg, Intravenous, Once PRN    oxyCODONE, 5 mg, Oral, Q4H PRN    oxyCODONE, 10 mg, Oral, Q4H PRN    OBJECTIVE:   Vital Signs:  VITAL SIGNS: 24 HR MIN & MAX LAST   Temp  Min: 98.6 °F (37 °C)  Max: 99.1 °F (37.3 °C)  99.1 °F (37.3 °C)   BP  Min: 132/78  Max: 168/105  (!) 168/105    Pulse  Min: 89  Max: 105  105    Resp  Min: 18  Max: 20  20    SpO2  Min: 98 %  Max: 100 %  100 %      HT: 5' 11" (180.3 cm)  WT: 113.4 kg (250 lb)  BMI: 34.9   Intake/output: No intake/output data recorded.     Lines/drains/airway:       Peripheral IV - Single Lumen 09/05/24 0820 20 G 1 1/4 in No Left;Posterior Hand (Active)   Number of days: 0       Physical Exam:  General:  Well developed, well nourished, no acute distress  HEENT:  Normocephalic, atraumatic  CV:  RR  Resp/chest: NWOB  GI:  Abdomen soft, non-tender, non-distended  :  Deferred  MSK:  No muscle atrophy, cyanosis, peripheral edema, moving all extremities spontaneously, splint in place to RLE, able to move toes, cap refill < 3 sec  Neuro: GCS 15. CNII-XII grossly intact, alert and oriented to person, place, and time. Strength and motor function grossly intact to all extremities, sensation intact to all extremities.  Skin/Wounds:  warm, dry, ecchymosis to left forearm/elbow    Labs:  Troponin:  No results for input(s): "TROPONINI" in the last 72 hours.  CBC:  Recent Labs     09/05/24  1058   WBC 13.86*   RBC 5.07   HGB 12.8   HCT 39.6   PLT " "317   MCV 78.1*   MCH 25.2*   MCHC 32.3*     CMP:  Recent Labs     09/05/24  1058   CALCIUM 8.9   ALBUMIN 3.6      K 3.9   CO2 21*   *   BUN 7.8   CREATININE 0.74   ALKPHOS 75   ALT 8   AST 12   BILITOT 0.3     Lactic Acid:  No results for input(s): "LACTATE" in the last 72 hours.  ETOH:  No results for input(s): "ETHANOL" in the last 72 hours.   Urine Drug Screen:  No results for input(s): "COCAINE", "OPIATE", "BARBITURATE", "AMPHETAMINE", "FENTANYL", "CANNABINOIDS", "MDMA" in the last 72 hours.    Invalid input(s): "BENZODIAZEPINE", "PHENCYCLIDINE"   ABG  No results for input(s): "PH", "PO2", "PCO2", "HCO3", "BE" in the last 168 hours.      Diagnostic Results:  CT Ankle (Including Hindfoot) Without Contrast Right   Final Result      X-Ray Tibia Fibula 2 View Right   Final Result      As above.         Electronically signed by: Rohan Hdez   Date:    09/05/2024   Time:    10:06      X-Ray Ankle Complete Right   Final Result      Distal tibia fracture.         Electronically signed by: Kim Pierce   Date:    09/05/2024   Time:    11:29      X-Ray Tibia Fibula 2 View Right   Final Result      1. Fracture of the proximal diaphysis of the fibula   2. Fracture of the distal metadiaphysis of the tibia.   3. Nondisplaced posterior malleolar fracture.         Electronically signed by: Kim Pierce   Date:    09/05/2024   Time:    10:56      X-Ray Knee 1 or 2 View Right   Final Result      No acute osseous abnormality identified.         Electronically signed by: Kennedy Collins   Date:    09/05/2024   Time:    10:21          ASSESSMENT & PLAN:      Minimally displaced comminuted distal tibial diametaphysis fracture with intra-articular extension  - Orthopedics consulted  - splint in place  - NWB RLE  - MMPC  - OR today with Ortho    Fall down stairs  - NPO  - mIVF @ 100 ml/h  - Daily labs   - MM pain control  - Frequent IS  - Prophylactic Lovenox 40mg BID     Any Bales AGASHELLP-BC, FNP-BC  Trauma " Surgery  [facility]  C: [phone]

## 2024-09-06 LAB
ALBUMIN SERPL-MCNC: 3.2 G/DL (ref 3.5–5)
ALBUMIN/GLOB SERPL: 1 RATIO (ref 1.1–2)
ALP SERPL-CCNC: 67 UNIT/L (ref 40–150)
ALT SERPL-CCNC: 6 UNIT/L (ref 0–55)
ANION GAP SERPL CALC-SCNC: 7 MEQ/L
AST SERPL-CCNC: 10 UNIT/L (ref 5–34)
BASOPHILS # BLD AUTO: 0.01 X10(3)/MCL
BASOPHILS NFR BLD AUTO: 0.1 %
BILIRUB SERPL-MCNC: 0.5 MG/DL
BUN SERPL-MCNC: 5 MG/DL (ref 7–18.7)
CALCIUM SERPL-MCNC: 8.6 MG/DL (ref 8.4–10.2)
CHLORIDE SERPL-SCNC: 109 MMOL/L (ref 98–107)
CO2 SERPL-SCNC: 23 MMOL/L (ref 22–29)
CREAT SERPL-MCNC: 0.72 MG/DL (ref 0.55–1.02)
CREAT/UREA NIT SERPL: 7
EOSINOPHIL # BLD AUTO: 0 X10(3)/MCL (ref 0–0.9)
EOSINOPHIL NFR BLD AUTO: 0 %
ERYTHROCYTE [DISTWIDTH] IN BLOOD BY AUTOMATED COUNT: 15.9 % (ref 11.5–17)
GFR SERPLBLD CREATININE-BSD FMLA CKD-EPI: >60 ML/MIN/1.73/M2
GLOBULIN SER-MCNC: 3.3 GM/DL (ref 2.4–3.5)
GLUCOSE SERPL-MCNC: 121 MG/DL (ref 74–100)
HCT VFR BLD AUTO: 34.6 % (ref 37–47)
HGB BLD-MCNC: 11 G/DL (ref 12–16)
IMM GRANULOCYTES # BLD AUTO: 0.06 X10(3)/MCL (ref 0–0.04)
IMM GRANULOCYTES NFR BLD AUTO: 0.5 %
LYMPHOCYTES # BLD AUTO: 1.04 X10(3)/MCL (ref 0.6–4.6)
LYMPHOCYTES NFR BLD AUTO: 8.3 %
MAGNESIUM SERPL-MCNC: 2.1 MG/DL (ref 1.6–2.6)
MCH RBC QN AUTO: 25 PG (ref 27–31)
MCHC RBC AUTO-ENTMCNC: 31.8 G/DL (ref 33–36)
MCV RBC AUTO: 78.6 FL (ref 80–94)
MONOCYTES # BLD AUTO: 0.73 X10(3)/MCL (ref 0.1–1.3)
MONOCYTES NFR BLD AUTO: 5.8 %
NEUTROPHILS # BLD AUTO: 10.7 X10(3)/MCL (ref 2.1–9.2)
NEUTROPHILS NFR BLD AUTO: 85.3 %
NRBC BLD AUTO-RTO: 0 %
PHOSPHATE SERPL-MCNC: 3 MG/DL (ref 2.3–4.7)
PLATELET # BLD AUTO: 310 X10(3)/MCL (ref 130–400)
PMV BLD AUTO: 10 FL (ref 7.4–10.4)
POTASSIUM SERPL-SCNC: 4.4 MMOL/L (ref 3.5–5.1)
PROT SERPL-MCNC: 6.5 GM/DL (ref 6.4–8.3)
RBC # BLD AUTO: 4.4 X10(6)/MCL (ref 4.2–5.4)
SODIUM SERPL-SCNC: 139 MMOL/L (ref 136–145)
WBC # BLD AUTO: 12.54 X10(3)/MCL (ref 4.5–11.5)

## 2024-09-06 PROCEDURE — 97162 PT EVAL MOD COMPLEX 30 MIN: CPT

## 2024-09-06 PROCEDURE — 94799 UNLISTED PULMONARY SVC/PX: CPT

## 2024-09-06 PROCEDURE — 27000221 HC OXYGEN, UP TO 24 HOURS

## 2024-09-06 PROCEDURE — 25000003 PHARM REV CODE 250

## 2024-09-06 PROCEDURE — 11000001 HC ACUTE MED/SURG PRIVATE ROOM

## 2024-09-06 PROCEDURE — 80053 COMPREHEN METABOLIC PANEL: CPT

## 2024-09-06 PROCEDURE — 97166 OT EVAL MOD COMPLEX 45 MIN: CPT

## 2024-09-06 PROCEDURE — 99900031 HC PATIENT EDUCATION (STAT)

## 2024-09-06 PROCEDURE — 85025 COMPLETE CBC W/AUTO DIFF WBC: CPT

## 2024-09-06 PROCEDURE — 83735 ASSAY OF MAGNESIUM: CPT

## 2024-09-06 PROCEDURE — 84100 ASSAY OF PHOSPHORUS: CPT

## 2024-09-06 PROCEDURE — 63600175 PHARM REV CODE 636 W HCPCS

## 2024-09-06 PROCEDURE — 99900035 HC TECH TIME PER 15 MIN (STAT)

## 2024-09-06 PROCEDURE — 63600175 PHARM REV CODE 636 W HCPCS: Performed by: PHYSICIAN ASSISTANT

## 2024-09-06 PROCEDURE — 25000003 PHARM REV CODE 250: Performed by: PHYSICIAN ASSISTANT

## 2024-09-06 PROCEDURE — 36415 COLL VENOUS BLD VENIPUNCTURE: CPT

## 2024-09-06 RX ADMIN — CEFAZOLIN SODIUM 2 G: 2 SOLUTION INTRAVENOUS at 06:09

## 2024-09-06 RX ADMIN — ENOXAPARIN SODIUM 40 MG: 40 INJECTION SUBCUTANEOUS at 09:09

## 2024-09-06 RX ADMIN — POLYETHYLENE GLYCOL 3350 17 G: 17 POWDER, FOR SOLUTION ORAL at 08:09

## 2024-09-06 RX ADMIN — OXYCODONE HYDROCHLORIDE 10 MG: 10 TABLET ORAL at 04:09

## 2024-09-06 RX ADMIN — DOCUSATE SODIUM 100 MG: 100 CAPSULE, LIQUID FILLED ORAL at 09:09

## 2024-09-06 RX ADMIN — METHOCARBAMOL 500 MG: 500 TABLET ORAL at 09:09

## 2024-09-06 RX ADMIN — METHOCARBAMOL 500 MG: 500 TABLET ORAL at 02:09

## 2024-09-06 RX ADMIN — ACETAMINOPHEN 650 MG: 325 TABLET, FILM COATED ORAL at 02:09

## 2024-09-06 RX ADMIN — ACETAMINOPHEN 650 MG: 325 TABLET, FILM COATED ORAL at 06:09

## 2024-09-06 RX ADMIN — ENOXAPARIN SODIUM 40 MG: 40 INJECTION SUBCUTANEOUS at 08:09

## 2024-09-06 RX ADMIN — DOCUSATE SODIUM 100 MG: 100 CAPSULE, LIQUID FILLED ORAL at 08:09

## 2024-09-06 RX ADMIN — MORPHINE SULFATE 4 MG: 4 INJECTION, SOLUTION INTRAMUSCULAR; INTRAVENOUS at 09:09

## 2024-09-06 RX ADMIN — OXYCODONE HYDROCHLORIDE 10 MG: 10 TABLET ORAL at 08:09

## 2024-09-06 RX ADMIN — MORPHINE SULFATE 4 MG: 4 INJECTION, SOLUTION INTRAMUSCULAR; INTRAVENOUS at 11:09

## 2024-09-06 RX ADMIN — POLYETHYLENE GLYCOL 3350 17 G: 17 POWDER, FOR SOLUTION ORAL at 09:09

## 2024-09-06 RX ADMIN — ACETAMINOPHEN 650 MG: 325 TABLET, FILM COATED ORAL at 09:09

## 2024-09-06 RX ADMIN — OXYCODONE HYDROCHLORIDE 10 MG: 10 TABLET ORAL at 06:09

## 2024-09-06 RX ADMIN — ACETAMINOPHEN 650 MG: 325 TABLET, FILM COATED ORAL at 05:09

## 2024-09-06 RX ADMIN — GABAPENTIN 300 MG: 300 CAPSULE ORAL at 02:09

## 2024-09-06 RX ADMIN — GABAPENTIN 300 MG: 300 CAPSULE ORAL at 09:09

## 2024-09-06 RX ADMIN — OXYCODONE HYDROCHLORIDE 10 MG: 10 TABLET ORAL at 12:09

## 2024-09-06 RX ADMIN — CEFAZOLIN SODIUM 2 G: 2 SOLUTION INTRAVENOUS at 03:09

## 2024-09-06 RX ADMIN — GABAPENTIN 300 MG: 300 CAPSULE ORAL at 08:09

## 2024-09-06 RX ADMIN — METHOCARBAMOL 500 MG: 500 TABLET ORAL at 06:09

## 2024-09-06 NOTE — NURSING
Nurses Note -- 4 Eyes      9/6/2024   12:11 PM      Skin assessed during: Q Shift Change      [] No Altered Skin Integrity Present    []Prevention Measures Documented      [x] Yes- Altered Skin Integrity Present or Discovered   [x] LDA Added if Not in Epic (Describe Wound)   [x] New Altered Skin Integrity was Present on Admit and Documented in LDA   [] Wound Image Taken    Wound Care Consulted? No    Attending Nurse:  MADELINE Dixon     Second RN/Staff Member:   MADELINE Harvey

## 2024-09-06 NOTE — PLAN OF CARE
Problem: Occupational Therapy  Goal: Occupational Therapy Goal  Description: LTG: Pt will perform basic ADLs and ADL transfers with Modified independence using LRAD by discharge.    STG: to be met by 10/4/24:    Pt will complete grooming standing at sink with LRAD with SBA.  Pt will complete UB dressing with SBA.  Pt will complete LB dressing with SBA using LRAD.  Pt will complete toileting with SBA using LRAD.  Pt will complete functional mobility to/from toilet and toilet transfer with SBA using LRAD.   Outcome: Progressing

## 2024-09-06 NOTE — PROGRESS NOTES
"[facility] - Ortho Neuro  Orthopedics  Progress Note    Patient Name: Faith Lee  MRN: [id number]  Admission Date: 9/5/2024  Hospital Length of Stay: 1 days  Attending Provider: Jarret Scott MD  Primary Care Provider: Suri, Primary Doctor    Subjective:     Principal Problem:Closed fracture of right distal tibia    Principal Orthopedic Problem: 1 Day Post-Op   IMN and ORIF R tibia    Interval History: Seen this am; doing well and pain controlled overnight-mostly sore in her knee. Has not been out of bed yet. Discussed mobility and NWB today with therapy, ok for ROM as she tolerates. She has young children at home but has family helping her.     Review of patient's allergies indicates:  No Known Allergies    Current Facility-Administered Medications   Medication    acetaminophen tablet 650 mg    cefazolin (ANCEF) 2 gram in dextrose 5% 50 mL IVPB (premix)    docusate sodium capsule 100 mg    enoxaparin injection 40 mg    gabapentin capsule 300 mg    magnesium hydroxide 400 mg/5 ml suspension 2,400 mg    melatonin tablet 6 mg    methocarbamoL tablet 500 mg    morphine injection 4 mg    ondansetron injection 4 mg    oxyCODONE immediate release tablet 5 mg    oxyCODONE immediate release tablet Tab 10 mg    polyethylene glycol packet 17 g    polyethylene glycol packet 17 g     Objective:     Vital Signs (Most Recent):  Temp: 98.1 °F (36.7 °C) (09/06/24 0708)  Pulse: 86 (09/06/24 0708)  Resp: 16 (09/06/24 0653)  BP: 127/85 (09/06/24 0708)  SpO2: 98 % (09/06/24 0708) Vital Signs (24h Range):  Temp:  [97.5 °F (36.4 °C)-99.1 °F (37.3 °C)] 98.1 °F (36.7 °C)  Pulse:  [] 86  Resp:  [10-26] 16  SpO2:  [95 %-100 %] 98 %  BP: (119-177)/() 127/85     Weight: 113.4 kg (250 lb)  Height: 5' 10" (177.8 cm)  Body mass index is 35.87 kg/m².      Intake/Output Summary (Last 24 hours) at 9/6/2024 0811  Last data filed at 9/6/2024 0700  Gross per 24 hour   Intake 2370 ml   Output 2350 ml   Net 20 ml "       Physical Exam:   General the patient is alert and in no acute distress;  nontoxic-appearing appropriate affect.    Constitutional: Vital signs are examined and stable.  Resp: No signs of labored breathing                     RLE: -Skin: Surgical dressing and boot; elevated           -MSK: +EHL/FHL, + DF/PF           -Neuro:  Sensation intact to light touch throughout           -CV: Capillary refill is less than 2 seconds. +DP. Compartments soft and compressible    Diagnostic Findings:     Significant Labs: CMP:   Recent Labs   Lab 09/05/24  1058 09/06/24  0420    139   K 3.9 4.4   * 109*   CO2 21* 23   BUN 7.8 5.0*   CREATININE 0.74 0.72   CALCIUM 8.9 8.6   ALBUMIN 3.6 3.2*   BILITOT 0.3 0.5   ALKPHOS 75 67   AST 12 10   ALT 8 6     All pertinent labs within the past 24 hours have been reviewed.    Significant Imaging: I have reviewed all pertinent imaging results/findings.     Assessment/Plan:     Active Diagnoses:    Diagnosis Date Noted POA    PRINCIPAL PROBLEM:  Closed fracture of right distal tibia [S82.301A] 09/05/2024 Yes    Fall down stairs [W10.8XXA] 09/05/2024 Yes      Problems Resolved During this Admission:   33YO F Here following a fall  POD #1 IMN/ORIF right distal tibia    Diet: As tolerated  Pain: multimodal PRN  DVT: Lovenox;  OK for ASA 81mg BID on DC  ABX: periop ancef complete  PT/OT: Eval and Treat  Activity: NWB RLE; ok for full ROM  Dressing changes begin tomorrow  Will need follow up in 3 weeks in Dr Underwood's office     The above findings, diagnostics, and treatment plan were discussed with Dr Underwood who is in agreement with the plan of care except as stated in additional documentation.      Lou Scott, ELSY   Orthopedic Trauma Surgery  [facility]

## 2024-09-06 NOTE — PT/OT/SLP EVAL
Physical Therapy Evaluation    Patient Name:  Faith Lee   MRN:  [id number]    Recommendations:     Discharge therapy intensity: Low Intensity Therapy   Discharge Equipment Recommendations: crutches, axillary   Barriers to discharge: Impaired mobility and Ongoing medical needs    Assessment:     Faith Lee is a 34 y.o. female admitted with a medical diagnosis of fall: closed fx of R distal tibia s/p ORIF 9/5, R proximal fibular fx, R posterior malleolus fx s/p ORIF 9/5.  She presents with the following impairments/functional limitations: orthopedic precautions, impaired functional mobility, gait instability, impaired balance, decreased lower extremity function   Pt with good tolerance to PT eval. She is currently Gokul-SBA for functional mobility. Pt has 6 steps to enter trailer, and her family is adding B handrails this weekend. She will need stair training before d/c. Recommending low intensity therapy upon d/c as pt will have assistance upon return home. .    Rehab Prognosis: Good; patient would benefit from acute skilled PT services to address these deficits and reach maximum level of function.    Recent Surgery: Procedure(s) (LRB):  INSERTION, INTRAMEDULLARY JESIKA, TIBIA (Right) 1 Day Post-Op    Plan:     During this hospitalization, patient would benefit from acute PT services 6 x/week to address the identified rehab impairments via gait training, therapeutic activities, therapeutic exercises, neuromuscular re-education and progress toward the following goals:    Plan of Care Expires:  10/06/24    Subjective     Chief Complaint: RLE pain  Patient/Family Comments/goals: to go home  Pain/Comfort:  Pain Rating 1: 7/10  Location - Side 1: Right  Location 1: ankle  Pain Addressed 1: Reposition, Distraction    Patients cultural, spiritual, Baptism conflicts given the current situation: no    Living Environment:  Pt lives in trail with 6 stairs to enter and no rails with her 3 young children. Pt's family to  install rails on stair this weekend.   Prior to admission, patients level of function was independent.  Equipment used at home: none.  DME owned (not currently used): none.  Upon discharge, patient will have assistance from cousin and family, who live in the area. .    Objective:     Communicated with NSG prior to session.  Patient found HOB elevated with PureWick , wedge under RLE upon PT entry to room.    General Precautions: Standard, fall  Orthopedic Precautions:RLE non weight bearing   Braces: N/A  Respiratory Status: Room air      Exams:  RLE ROM: AROM limited due to RLE pain  RLE Strength: hip = at least 3/5; knee and ankle deferred due to pain  LLE ROM: WFL  LLE Strength: grossly 5/5  Skin integrity:  RLE covered in bandage and boot      Functional Mobility:  Bed Mobility:     Supine to Sit: minimum assistance  Transfers:     Sit to Stand:  contact guard assistance with rolling walker  Gait: 20' to and from bathroom with hopping gait pattern, CGA, and RW. VC to maintain NWB on RLE.   Balance: fair with ambulation with RW       AM-PAC 6 CLICK MOBILITY  Total Score:19       Treatment & Education:  Patient provided with verbal education education regarding PT role/goals/POC, post-op precautions, fall prevention, safety awareness, and discharge/DME recommendations.  Understanding was verbalized.     Patient left up in chair with all lines intact, call button in reach, and RN notified.    GOALS:   Multidisciplinary Problems       Physical Therapy Goals          Problem: Physical Therapy    Goal Priority Disciplines Outcome Goal Variances Interventions   Physical Therapy Goal     PT, PT/OT Progressing     Description: Goals to be met by: 10/6/24     Patient will increase functional independence with mobility by performin. Sit to stand transfer with Modified [place]  2. Gait  x 50 feet with Modified [place] using Crutches or LRAD.   3. Ascend/descend 6 stair with bilateral Handrails Modified  [place] using Crutches or LRAD.                          History:     History reviewed. No pertinent past medical history.    Past Surgical History:   Procedure Laterality Date    INSERTION OF INTRAMEDULLARY NAIL INTO TIBIA Right 9/5/2024    Procedure: INSERTION, INTRAMEDULLARY JESIKA, TIBIA;  Surgeon: Yifan Underwood DO;  Location: [facility];  Service: Orthopedics;  Laterality: Right;  supine vascular bone foam c arm nail plate, small CCHS       Time Tracking:     PT Received On: 09/06/24  PT Start Time: 1013     PT Stop Time: 1029  PT Total Time (min): 16 min     Billable Minutes: Evaluation mod      09/06/2024

## 2024-09-06 NOTE — PROGRESS NOTES
"   Trauma Surgery   Progress Note  Admit Date: 9/5/2024  HD#1  POD#1 Day Post-Op    Subjective:   Interval history:  AF HDS  Tolerating diet  Voiding urine appropriately, 1.2 ml/kg/hr  Pain well controlled  Worked with PT/OT today    Home Meds: No current outpatient medications   Scheduled Meds:   acetaminophen  650 mg Oral Q4H    ceFAZolin (Ancef) IV (PEDS and ADULTS)  2 g Intravenous Q8H    docusate sodium  100 mg Oral BID    enoxparin  40 mg Subcutaneous Q12H    gabapentin  300 mg Oral TID    methocarbamoL  500 mg Oral Q8H    polyethylene glycol  17 g Oral BID    polyethylene glycol  17 g Oral Daily     Continuous Infusions:  PRN Meds:  Current Facility-Administered Medications:     magnesium hydroxide 400 mg/5 ml, 30 mL, Oral, Daily PRN    melatonin, 6 mg, Oral, Nightly PRN    morphine, 4 mg, Intravenous, Q6H PRN    ondansetron, 4 mg, Intravenous, Q6H PRN    oxyCODONE, 5 mg, Oral, Q4H PRN    oxyCODONE, 10 mg, Oral, Q4H PRN     Objective:     VITAL SIGNS: 24 HR MIN & MAX LAST   Temp  Min: 97.5 °F (36.4 °C)  Max: 98.5 °F (36.9 °C)  98.5 °F (36.9 °C)   BP  Min: 119/77  Max: 177/115  135/80    Pulse  Min: 62  Max: 115  91    Resp  Min: 10  Max: 26  16    SpO2  Min: 95 %  Max: 100 %  98 %      HT: 5' 10" (177.8 cm)  WT: 113.4 kg (250 lb)  BMI: 35.9     Intake/output:  Intake/Output - Last 3 Shifts         09/04 0700  09/05 0659 09/05 0700 09/06 0659 09/06 0700  09/07 0659    P.O.   440    IV Piggyback  2150     Total Intake(mL/kg)  2150 (19) 440 (3.9)    Urine (mL/kg/hr)  2350 500 (0.7)    Total Output  2350 500    Net  -200 -60           Urine Occurrence  1 x             Intake/Output Summary (Last 24 hours) at 9/6/2024 1312  Last data filed at 9/6/2024 1139  Gross per 24 hour   Intake 2590 ml   Output 2850 ml   Net -260 ml         Lines/drains/airway:       Peripheral IV - Single Lumen 09/05/24 0820 20 G 1 1/4 in No Left;Posterior Hand (Active)   Site Assessment Clean;Dry;Intact;No redness;No swelling 09/06/24 " "1200   Extremity Assessment Distal to IV No abnormal discoloration;No redness;No swelling;No warmth 09/06/24 0801   Line Status Flushed;Saline locked 09/06/24 1200   Dressing Status Clean;Dry;Intact 09/06/24 1200   Dressing Intervention Integrity maintained 09/06/24 1200   Dressing Change Due 09/09/24 09/06/24 0801   Site Change Due 09/09/24 09/06/24 0801   Reason Not Rotated Not due 09/06/24 0801   Number of days: 1       Physical examination:  Gen: NAD, AAOx3, answering questions appropriately  HEENT: normocephalic, atraumatic  CV: RR  Resp: NWOB  Abd: S/NT/ND  Msk: moving all extremities spontaneously and purposefully, surgical dressing/boot in place RLE  Neuro: CN II-XII grossly intact  Skin/wounds: warm and dry    Labs:  Renal:  Recent Labs     09/05/24  1058 09/06/24  0420   BUN 7.8 5.0*   CREATININE 0.74 0.72     No results for input(s): "LACTIC" in the last 72 hours.  FEN/GI:  Recent Labs     09/05/24  1058 09/06/24  0420    139   K 3.9 4.4   * 109*   CO2 21* 23   CALCIUM 8.9 8.6   MG  --  2.10   PHOS  --  3.0   ALBUMIN 3.6 3.2*   BILITOT 0.3 0.5   AST 12 10   ALKPHOS 75 67   ALT 8 6     Heme:  Recent Labs     09/05/24  1058 09/06/24  0420   HGB 12.8 11.0*   HCT 39.6 34.6*    310   INR 1.1*  --      ID:  Recent Labs     09/05/24  1058 09/06/24  0420   WBC 13.86* 12.54*     CBG:  Recent Labs     09/05/24  1058 09/06/24  0420   GLUCOSE 94 121*      No results for input(s): "POCTGLUCOSE" in the last 72 hours.   Cardiovascular:  No results for input(s): "TROPONINI", "CKTOTAL", "CKMB", "BNP" in the last 168 hours.  I have reviewed all pertinent lab results within the past 24 hours.    Imaging:  X-Ray Tibia Fibula 2 View Right   Final Result      Postoperative changes seen consistent with recent stabilization a distal tibia fracture         Electronically signed by: Rajesh Torres   Date:    09/05/2024   Time:    18:36      SURG FL Surgery Fluoro Usage   Final Result      CT Ankle (Including " Hindfoot) Without Contrast Right   Final Result      X-Ray Tibia Fibula 2 View Right   Final Result      As above.         Electronically signed by: Rohan Hdez   Date:    09/05/2024   Time:    10:06      X-Ray Ankle Complete Right   Final Result      Distal tibia fracture.         Electronically signed by: Kim Pierce   Date:    09/05/2024   Time:    11:29      X-Ray Tibia Fibula 2 View Right   Final Result      1. Fracture of the proximal diaphysis of the fibula   2. Fracture of the distal metadiaphysis of the tibia.   3. Nondisplaced posterior malleolar fracture.         Electronically signed by: Kim Pierce   Date:    09/05/2024   Time:    10:56      X-Ray Knee 1 or 2 View Right   Final Result      No acute osseous abnormality identified.         Electronically signed by: Kennedy Collins   Date:    09/05/2024   Time:    10:21         I have reviewed all pertinent imaging results/findings within the past 24 hours.    Micro/Path/Other:  Microbiology Results (last 7 days)       ** No results found for the last 168 hours. **           Pathology Results  (Last 7 days)      None             Problems list:  Active Problem List with Overview Notes    Diagnosis Date Noted    Closed fracture of right distal tibia 09/05/2024    Fall down stairs 09/05/2024        Assessment & Plan:   Faith Lee, 33 yo female, admitted following fall down stairs resulting in closed fracture of R distal tibia    Closed fracture R distal tibia  - s/p IMN/ORIF R distal tibia with ortho (Dr. Underwood) on 9/5/24  - NWB RLE, ok for full ROM  - multimodal pain control  - dvt ppx w lovenox 40 BID, ok for ASA 81 mg BID on DC  - PT/OT  - dressing changes begin tomorrow  - will need follow-up in 3 weeks with Dr. Underwood    Dispo: discharge pending home accommodations/stair training , likely tomorrow    Lisa Keller MD  [facility] General Surgery PGY-1  9/6/24

## 2024-09-06 NOTE — PLAN OF CARE
09/06/24 1321   Discharge Assessment   Assessment Type Discharge Planning Assessment   Confirmed/corrected address, phone number and insurance Yes   Confirmed Demographics Correct on Facesheet   Source of Information patient   Communicated JOSE with patient/caregiver Yes   Reason For Admission fall down stairs   People in Home child(hever), dependent   Do you expect to return to your current living situation? Yes   Do you have help at home or someone to help you manage your care at home? No   Prior to hospitilization cognitive status: Unable to Assess   Current cognitive status: Alert/Oriented   Walking or Climbing Stairs Difficulty no   Dressing/Bathing Difficulty no   Home Accessibility stairs to enter home   Number of Stairs, Main Entrance six   Stair Railings, Main Entrance none   Equipment Currently Used at Home none   Readmission within 30 days? No   Patient currently being followed by outpatient case management? No   Do you currently have service(s) that help you manage your care at home? No   Do you take prescription medications? Yes   Do you have prescription coverage? Yes   Coverage [facility]   Do you have any problems affording any of your prescribed medications? No   Is the patient taking medications as prescribed? yes   Who is going to help you get home at discharge? family   How do you get to doctors appointments? family or friend will provide;car, drives self   Are you on dialysis? No   Do you take coumadin? No   Discharge Plan A Home Health   Discharge Plan B Home Health   DME Needed Upon Discharge  crutches, axillary   Discharge Plan discussed with: Patient   Transition of Care Barriers None   OTHER   Name(s) of People in Home children     Completed assessment with pt at bedside. Introduced self and explained role as SW. Pt verb understanding to all questions asked. Pt does not  have PCP; note placed in chart for discharging nurse/ to assist. Pharmacy is [facility] in [place]. Therapy to provide  crutches prior to d/c. Pt agreeable with HH services. Referral sent to [facility] via [facility] ([facility]ozi rotation).     Gayatri Dailey LCSW

## 2024-09-06 NOTE — PT/OT/SLP EVAL
Occupational Therapy  Evaluation    Name: Faith Lee  MRN: [id number]  Admitting Diagnosis: fall: R distal tibia fx s/p ORIF, R proximal fibular fx, R posterior malleolus fx s/p ORIF   Recent Surgery: Procedure(s) (LRB):  INSERTION, INTRAMEDULLARY JESIKA, TIBIA (Right) 1 Day Post-Op    Recommendations:     Discharge therapy intensity: Low Intensity Therapy   Discharge Equipment Recommendations:   (Defer to PT for ambulation AD)  Barriers to discharge:  None    Assessment:     Faith Lee is a 34 y.o. female with a medical diagnosis of fall: R distal tibia fx s/p ORIF, R proximal fibular fx, R posterior malleolus fx s/p ORIF.  She presents with the following performance deficits affecting function: weakness, impaired endurance, orthopedic precautions, impaired self care skills, impaired functional mobility, impaired balance, pain. Pt able to ambulate to bathroom and complete sit<>stand from toilet c CGA. Pt reported she have assistance from family at d/c. Recommend low intensity therapy.     Rehab Prognosis: Good; patient would benefit from acute skilled OT services to address these deficits and reach maximum level of function.       Plan:     Patient to be seen 6 x/week to address the above listed problems via self-care/home management, therapeutic activities, therapeutic exercises  Plan of Care Expires: 10/04/24  Plan of Care Reviewed with: patient    Subjective     Chief Complaint: Pain in RLE   Patient/Family Comments/goals: To go home     Occupational Profile:  Living Environment: Pt lives c her 3 young children in a trailer c 6 stairs and no rails to enter and a tub and walk in shower. Reported family is working on installing hand rails.   Previous level of function: Ind c ADLs and mobility without AD   Roles and Routines: Mother, works for [employer]   Equipment Used at Home: none  Assistance upon Discharge: Family     Pain/Comfort:  Pain Rating 1: 7/10  Location - Side 1: Right  Location 1: ankle  Pain Addressed 1:  Reposition, Distraction    Patients cultural, spiritual, Bahai conflicts given the current situation: no    Objective:     OT communicated with RN prior to session.      Patient was found sitting edge of bed with PureWick  and PT present upon OT entry to room.    General Precautions: Standard, fall  Orthopedic Precautions: RLE non weight bearing  Braces: N/A      Bed Mobility:    See PT note    Functional Mobility/Transfers:  Patient completed Sit <> Stand Transfer with contact guard assistance  with  rolling walker   Patient completed Toilet Transfer hop to technique with contact guard assistance with  rolling walker  Functional Mobility: Pt ambulated to bathroom using RW c CGA. Cues for positioning on RLE to maintain NWB.      Functional Cognition:  Intact    Visual Perceptual Skills:  Intact    Upper Extremity Function:  Right Upper Extremity:   WFL    Left Upper Extremity:  WFL      Therapeutic Positioning  Risk for acquired pressure injuries is decreased due to ability to get to BSC/toilet with assist.    OT interventions performed during the course of today's session:   Education was provided on benefits of and recommendations for therapeutic positioning      Patient Education:  Patient provided with verbal education education regarding OT role/goals/POC, post op precautions, and fall prevention.  Understanding was verbalized.     Patient left up in chair with call button in reach.    GOALS:   Multidisciplinary Problems       Occupational Therapy Goals          Problem: Occupational Therapy    Goal Priority Disciplines Outcome Interventions   Occupational Therapy Goal     OT, PT/OT Progressing    Description: LTG: Pt will perform basic ADLs and ADL transfers with Modified independence using LRAD by discharge.    STG: to be met by 10/4/24:    Pt will complete grooming standing at sink with LRAD with SBA.  Pt will complete UB dressing with SBA.  Pt will complete LB dressing with SBA using LRAD.  Pt will  complete toileting with SBA using LRAD.  Pt will complete functional mobility to/from toilet and toilet transfer with SBA using LRAD.                        History:     History reviewed. No pertinent past medical history.      Past Surgical History:   Procedure Laterality Date    INSERTION OF INTRAMEDULLARY NAIL INTO TIBIA Right 9/5/2024    Procedure: INSERTION, INTRAMEDULLARY JESIKA, TIBIA;  Surgeon: Yifan Underwood DO;  Location: [facility];  Service: Orthopedics;  Laterality: Right;  supine vascular bone foam c arm nail plate, small CCHS       Time Tracking:     OT Date of Treatment: 09/06/24  OT Start Time: 1019  OT Stop Time: 1029  OT Total Time (min): 10 min    Billable Minutes:Evaluation Moderate complexity    9/6/2024

## 2024-09-06 NOTE — PLAN OF CARE
Problem: Physical Therapy  Goal: Physical Therapy Goal  Description: Goals to be met by: 10/6/24     Patient will increase functional independence with mobility by performin. Sit to stand transfer with Modified [place]  2. Gait  x 50 feet with Modified [place] using Crutches or LRAD.   3. Ascend/descend 6 stair with bilateral Handrails Modified [place] using Crutches or LRAD.     Outcome: Progressing

## 2024-09-06 NOTE — ANESTHESIA POSTPROCEDURE EVALUATION
Anesthesia Post Evaluation    Patient: Faith Lee    Procedure(s) Performed: Procedure(s) (LRB):  INSERTION, INTRAMEDULLARY JESIKA, TIBIA (Right)    Final Anesthesia Type: general (/Regional//MAC)      Patient location during evaluation: PACU  Post-procedure mental status: @ basline.  Pain management: adequate    PONV status: See postop meds for drugs used to control n/v if any.  Anesthetic complications: no      Cardiovascular status: blood pressure returned to baseline  Respiratory status: @ baseline.  Hydration status: euvolemic                Vitals Value Taken Time   /85 09/05/24 2041   Temp 36.4 °C (97.5 °F) 09/05/24 2041   Pulse 75 09/05/24 2041   Resp 13 09/05/24 2035   SpO2 100 % 09/05/24 2041   Vitals shown include unfiled device data.      No case tracking events are documented in the log.      Pain/Solomon Score: Pain Rating Prior to Med Admin: 8 (9/5/2024  4:55 PM)  Pain Rating Post Med Admin: 3 (9/5/2024 10:53 AM)  Solomon Score: 10 (9/5/2024  8:00 PM)

## 2024-09-07 VITALS
DIASTOLIC BLOOD PRESSURE: 75 MMHG | HEART RATE: 89 BPM | OXYGEN SATURATION: 100 % | RESPIRATION RATE: 18 BRPM | WEIGHT: 250 LBS | TEMPERATURE: 98 F | BODY MASS INDEX: 35.79 KG/M2 | SYSTOLIC BLOOD PRESSURE: 124 MMHG | HEIGHT: 70 IN

## 2024-09-07 LAB
ALBUMIN SERPL-MCNC: 2.9 G/DL (ref 3.5–5)
ALBUMIN/GLOB SERPL: 0.9 RATIO (ref 1.1–2)
ALP SERPL-CCNC: 62 UNIT/L (ref 40–150)
ALT SERPL-CCNC: 18 UNIT/L (ref 0–55)
ANION GAP SERPL CALC-SCNC: 5 MEQ/L
AST SERPL-CCNC: 25 UNIT/L (ref 5–34)
BASOPHILS # BLD AUTO: 0.04 X10(3)/MCL
BASOPHILS NFR BLD AUTO: 0.4 %
BILIRUB SERPL-MCNC: 0.3 MG/DL
BUN SERPL-MCNC: 7.3 MG/DL (ref 7–18.7)
CALCIUM SERPL-MCNC: 8.1 MG/DL (ref 8.4–10.2)
CHLORIDE SERPL-SCNC: 108 MMOL/L (ref 98–107)
CO2 SERPL-SCNC: 25 MMOL/L (ref 22–29)
CREAT SERPL-MCNC: 0.72 MG/DL (ref 0.55–1.02)
CREAT/UREA NIT SERPL: 10
EOSINOPHIL # BLD AUTO: 0.03 X10(3)/MCL (ref 0–0.9)
EOSINOPHIL NFR BLD AUTO: 0.3 %
ERYTHROCYTE [DISTWIDTH] IN BLOOD BY AUTOMATED COUNT: 16 % (ref 11.5–17)
GFR SERPLBLD CREATININE-BSD FMLA CKD-EPI: >60 ML/MIN/1.73/M2
GLOBULIN SER-MCNC: 3.3 GM/DL (ref 2.4–3.5)
GLUCOSE SERPL-MCNC: 108 MG/DL (ref 74–100)
HCT VFR BLD AUTO: 32 % (ref 37–47)
HGB BLD-MCNC: 10.3 G/DL (ref 12–16)
IMM GRANULOCYTES # BLD AUTO: 0.04 X10(3)/MCL (ref 0–0.04)
IMM GRANULOCYTES NFR BLD AUTO: 0.4 %
LYMPHOCYTES # BLD AUTO: 2.14 X10(3)/MCL (ref 0.6–4.6)
LYMPHOCYTES NFR BLD AUTO: 22.2 %
MCH RBC QN AUTO: 25.2 PG (ref 27–31)
MCHC RBC AUTO-ENTMCNC: 32.2 G/DL (ref 33–36)
MCV RBC AUTO: 78.4 FL (ref 80–94)
MONOCYTES # BLD AUTO: 0.85 X10(3)/MCL (ref 0.1–1.3)
MONOCYTES NFR BLD AUTO: 8.8 %
NEUTROPHILS # BLD AUTO: 6.54 X10(3)/MCL (ref 2.1–9.2)
NEUTROPHILS NFR BLD AUTO: 67.9 %
NRBC BLD AUTO-RTO: 0 %
PLATELET # BLD AUTO: 258 X10(3)/MCL (ref 130–400)
PMV BLD AUTO: 10 FL (ref 7.4–10.4)
POTASSIUM SERPL-SCNC: 3.7 MMOL/L (ref 3.5–5.1)
PROT SERPL-MCNC: 6.2 GM/DL (ref 6.4–8.3)
RBC # BLD AUTO: 4.08 X10(6)/MCL (ref 4.2–5.4)
SODIUM SERPL-SCNC: 138 MMOL/L (ref 136–145)
WBC # BLD AUTO: 9.64 X10(3)/MCL (ref 4.5–11.5)

## 2024-09-07 PROCEDURE — 97116 GAIT TRAINING THERAPY: CPT | Mod: CQ

## 2024-09-07 PROCEDURE — 25000003 PHARM REV CODE 250

## 2024-09-07 PROCEDURE — 63600175 PHARM REV CODE 636 W HCPCS: Performed by: PHYSICIAN ASSISTANT

## 2024-09-07 PROCEDURE — 63600175 PHARM REV CODE 636 W HCPCS

## 2024-09-07 PROCEDURE — 80053 COMPREHEN METABOLIC PANEL: CPT

## 2024-09-07 PROCEDURE — 99900035 HC TECH TIME PER 15 MIN (STAT)

## 2024-09-07 PROCEDURE — 97530 THERAPEUTIC ACTIVITIES: CPT | Mod: CQ

## 2024-09-07 PROCEDURE — 85025 COMPLETE CBC W/AUTO DIFF WBC: CPT

## 2024-09-07 PROCEDURE — 94799 UNLISTED PULMONARY SVC/PX: CPT

## 2024-09-07 PROCEDURE — 99900031 HC PATIENT EDUCATION (STAT)

## 2024-09-07 PROCEDURE — 36415 COLL VENOUS BLD VENIPUNCTURE: CPT

## 2024-09-07 RX ORDER — GABAPENTIN 300 MG/1
300 CAPSULE ORAL 3 TIMES DAILY
Qty: 30 CAPSULE | Refills: 0 | Status: SHIPPED | OUTPATIENT
Start: 2024-09-07 | End: 2024-09-07

## 2024-09-07 RX ORDER — OXYCODONE AND ACETAMINOPHEN 10; 325 MG/1; MG/1
1 TABLET ORAL EVERY 4 HOURS PRN
Qty: 20 TABLET | Refills: 0 | Status: SHIPPED | OUTPATIENT
Start: 2024-09-07 | End: 2024-09-10 | Stop reason: SDUPTHER

## 2024-09-07 RX ORDER — GABAPENTIN 300 MG/1
300 CAPSULE ORAL 3 TIMES DAILY
Qty: 30 CAPSULE | Refills: 0 | Status: SHIPPED | OUTPATIENT
Start: 2024-09-07 | End: 2024-09-17

## 2024-09-07 RX ORDER — ASPIRIN 81 MG/1
81 TABLET ORAL 2 TIMES DAILY
Qty: 60 TABLET | Refills: 0 | Status: SHIPPED | OUTPATIENT
Start: 2024-09-07 | End: 2024-10-07

## 2024-09-07 RX ORDER — ONDANSETRON 4 MG/1
4 TABLET, ORALLY DISINTEGRATING ORAL EVERY 6 HOURS PRN
Qty: 12 TABLET | Refills: 0 | Status: SHIPPED | OUTPATIENT
Start: 2024-09-07 | End: 2024-09-12 | Stop reason: SDUPTHER

## 2024-09-07 RX ORDER — PROMETHAZINE HYDROCHLORIDE 25 MG/ML
12.5 INJECTION, SOLUTION INTRAMUSCULAR; INTRAVENOUS EVERY 6 HOURS PRN
Status: DISCONTINUED | OUTPATIENT
Start: 2024-09-07 | End: 2024-09-07 | Stop reason: HOSPADM

## 2024-09-07 RX ORDER — METHOCARBAMOL 500 MG/1
500 TABLET, FILM COATED ORAL EVERY 8 HOURS
Qty: 30 TABLET | Refills: 0 | Status: SHIPPED | OUTPATIENT
Start: 2024-09-07 | End: 2024-09-17

## 2024-09-07 RX ORDER — ONDANSETRON 4 MG/1
4 TABLET, ORALLY DISINTEGRATING ORAL EVERY 6 HOURS PRN
Qty: 12 TABLET | Refills: 0 | Status: SHIPPED | OUTPATIENT
Start: 2024-09-07 | End: 2024-09-07

## 2024-09-07 RX ORDER — METHOCARBAMOL 500 MG/1
500 TABLET, FILM COATED ORAL EVERY 8 HOURS
Qty: 30 TABLET | Refills: 0 | Status: SHIPPED | OUTPATIENT
Start: 2024-09-07 | End: 2024-09-07

## 2024-09-07 RX ORDER — OXYCODONE AND ACETAMINOPHEN 10; 325 MG/1; MG/1
1 TABLET ORAL EVERY 4 HOURS PRN
Qty: 20 TABLET | Refills: 0 | Status: SHIPPED | OUTPATIENT
Start: 2024-09-07 | End: 2024-09-07

## 2024-09-07 RX ORDER — ASPIRIN 81 MG/1
81 TABLET ORAL 2 TIMES DAILY
Qty: 60 TABLET | Refills: 0 | Status: SHIPPED | OUTPATIENT
Start: 2024-09-07 | End: 2024-09-07

## 2024-09-07 RX ADMIN — OXYCODONE HYDROCHLORIDE 10 MG: 10 TABLET ORAL at 02:09

## 2024-09-07 RX ADMIN — ENOXAPARIN SODIUM 40 MG: 40 INJECTION SUBCUTANEOUS at 09:09

## 2024-09-07 RX ADMIN — ACETAMINOPHEN 650 MG: 325 TABLET, FILM COATED ORAL at 02:09

## 2024-09-07 RX ADMIN — ONDANSETRON 4 MG: 2 INJECTION INTRAMUSCULAR; INTRAVENOUS at 07:09

## 2024-09-07 RX ADMIN — OXYCODONE HYDROCHLORIDE 10 MG: 10 TABLET ORAL at 12:09

## 2024-09-07 RX ADMIN — OXYCODONE HYDROCHLORIDE 10 MG: 10 TABLET ORAL at 09:09

## 2024-09-07 RX ADMIN — OXYCODONE HYDROCHLORIDE 10 MG: 10 TABLET ORAL at 07:09

## 2024-09-07 RX ADMIN — ACETAMINOPHEN 650 MG: 325 TABLET, FILM COATED ORAL at 09:09

## 2024-09-07 RX ADMIN — PROMETHAZINE HYDROCHLORIDE 12.5 MG: 25 INJECTION INTRAMUSCULAR; INTRAVENOUS at 12:09

## 2024-09-07 RX ADMIN — GABAPENTIN 300 MG: 300 CAPSULE ORAL at 09:09

## 2024-09-07 RX ADMIN — ACETAMINOPHEN 650 MG: 325 TABLET, FILM COATED ORAL at 05:09

## 2024-09-07 RX ADMIN — ONDANSETRON 4 MG: 2 INJECTION INTRAMUSCULAR; INTRAVENOUS at 01:09

## 2024-09-07 RX ADMIN — GABAPENTIN 300 MG: 300 CAPSULE ORAL at 02:09

## 2024-09-07 RX ADMIN — DOCUSATE SODIUM 100 MG: 100 CAPSULE, LIQUID FILLED ORAL at 12:09

## 2024-09-07 RX ADMIN — METHOCARBAMOL 500 MG: 500 TABLET ORAL at 05:09

## 2024-09-07 RX ADMIN — METHOCARBAMOL 500 MG: 500 TABLET ORAL at 02:09

## 2024-09-07 RX ADMIN — OXYCODONE HYDROCHLORIDE 10 MG: 10 TABLET ORAL at 05:09

## 2024-09-07 RX ADMIN — POLYETHYLENE GLYCOL 3350 17 G: 17 POWDER, FOR SOLUTION ORAL at 12:09

## 2024-09-07 NOTE — PT/OT/SLP PROGRESS
Physical Therapy Treatment    Patient Name:  Faith Lee   MRN:  [id number]    Recommendations:     Discharge therapy intensity: Low Intensity Therapy   Discharge Equipment Recommendations: crutches, axillary  Barriers to discharge: None    Assessment:     Faith Lee is a 34 y.o. female admitted with a medical diagnosis of IMR right tib.  She presents with the following impairments/functional limitations: orthopedic precautions, impaired functional mobility, gait instability, impaired balance, decreased lower extremity function.    Rehab Prognosis: Good; patient would benefit from acute skilled PT services to address these deficits and reach maximum level of function.    Recent Surgery: Procedure(s) (LRB):  INSERTION, INTRAMEDULLARY JESIKA, TIBIA (Right) 2 Days Post-Op    Plan:     During this hospitalization, patient would benefit from acute PT services 6 x/week to address the identified rehab impairments via gait training, therapeutic activities, therapeutic exercises, neuromuscular re-education and progress toward the following goals:    Plan of Care Expires:  10/06/24    Subjective     Chief Complaint: pain but managed    Objective:     Communicated with nurse prior to session.  Patient found supine with PureWick upon PT entry to room.     General Precautions: Standard, fall  Orthopedic Precautions: RLE non weight bearing  Braces: N/A  Respiratory Status: Room air  Blood Pressure:   Skin Integrity: Visible skin intact      Functional Mobility:  Mod I EOB and STS/transfers  Gait 50 ft RW SBA and 15 ft crutches min assist, NWB RLE  5 step min assist bilateral rails and gait belt issued.   Adjusted CAM for proper fit.     Education Provided:  Role and goals of PT, transfer training, bed mobility, gait training, balance training, safety awareness, assistive device, strengthening exercises, and importance of participating in PT to return to PLOF.    Patient left up in chair with all lines intact and call button in  reach    GOALS:   Multidisciplinary Problems       Physical Therapy Goals          Problem: Physical Therapy    Goal Priority Disciplines Outcome Goal Variances Interventions   Physical Therapy Goal     PT, PT/OT Progressing     Description: Goals to be met by: 10/6/24     Patient will increase functional independence with mobility by performin. Sit to stand transfer with Modified [place]  2. Gait  x 50 feet with Modified [place] using Crutches or LRAD.   3. Ascend/descend 6 stair with bilateral Handrails Modified [place] using Crutches or LRAD.                          Time Tracking:     Billable Minutes: Gait Training 15 and Therapeutic Activity 15    Treatment Type: Treatment  PT/PTA: PTA     Number of PTA visits since last PT visit: 2024     no

## 2024-09-07 NOTE — PLAN OF CARE
09/07/24 1336   Final Note   Assessment Type Final Discharge Note   Anticipated Discharge Disposition Home-Health   Post-Acute Status   Post-Acute Authorization Home Health   Home Health Status Set-up Complete/Auth obtained   Discharge Delays None known at this time     Pt will dc to home with [facility] . TREASURE, sariah info sent     RW order placed with [facility] and asked them to ship to home if approved

## 2024-09-07 NOTE — PROGRESS NOTES
"[facility]  [facility] Note    Patient Name: Faith Lee  MRN: [id number]  Admission Date: 9/5/2024  Hospital Length of Stay: 2 days  Attending Provider: Jarret Scott MD  Primary Care Provider: Suri, Primary Doctor    Subjective:     Principal Problem:Closed fracture of right distal tibia    Principal Orthopedic Problem: 2 Days Post-Op   IMN and ORIF R tibia    Interval History:  Resting comfortably this morning.  Pain is well controlled with oral medications.  She has only needed 1-2 doses of more she is working well physical therapy ambulating well with a walker.  Remains nonweightbearing.  No numbness or tingling distally soft dressing in place with boot.  Does have help from family at home to help with  as well as any postsurgical needs.    Review of patient's allergies indicates:  No Known Allergies    Current Facility-Administered Medications   Medication    acetaminophen tablet 650 mg    docusate sodium capsule 100 mg    enoxaparin injection 40 mg    gabapentin capsule 300 mg    magnesium hydroxide 400 mg/5 ml suspension 2,400 mg    melatonin tablet 6 mg    methocarbamoL tablet 500 mg    morphine injection 4 mg    ondansetron injection 4 mg    oxyCODONE immediate release tablet 5 mg    oxyCODONE immediate release tablet Tab 10 mg    polyethylene glycol packet 17 g    polyethylene glycol packet 17 g     Objective:     Vital Signs (Most Recent):  Temp: 98.5 °F (36.9 °C) (09/07/24 0701)  Pulse: 82 (09/07/24 0701)  Resp: 18 (09/07/24 0701)  BP: 120/75 (09/07/24 0701)  SpO2: 99 % (09/07/24 0701) Vital Signs (24h Range):  Temp:  [98.1 °F (36.7 °C)-98.9 °F (37.2 °C)] 98.5 °F (36.9 °C)  Pulse:  [82-96] 82  Resp:  [13-18] 18  SpO2:  [98 %-100 %] 99 %  BP: (114-135)/(71-80) 120/75     Weight: 113.4 kg (250 lb)  Height: 5' 10" (177.8 cm)  Body mass index is 35.87 kg/m².      Intake/Output Summary (Last 24 hours) at 9/7/2024 0851  Last data filed at 9/6/2024 " 1520  Gross per 24 hour   Intake 440 ml   Output 2 ml   Net 438 ml       Physical Exam:   General the patient is alert and in no acute distress;  nontoxic-appearing appropriate affect.    Constitutional: Vital signs are examined and stable.  Resp: No signs of labored breathing                     RLE: -Skin: Surgical dressing and boot; elevated           -MSK: +EHL/FHL, + DF/PF; tolerates gentle passive range of motion of the knee and ankle.           -Neuro:  Sensation intact to light touch throughout           -CV: Capillary refill is less than 2 seconds. +DP. Compartments soft and compressible    Diagnostic Findings:     Significant Labs: CMP:   Recent Labs   Lab 09/05/24  1058 09/06/24  0420 09/07/24  0407    139 138   K 3.9 4.4 3.7   * 109* 108*   CO2 21* 23 25   BUN 7.8 5.0* 7.3   CREATININE 0.74 0.72 0.72   CALCIUM 8.9 8.6 8.1*   ALBUMIN 3.6 3.2* 2.9*   BILITOT 0.3 0.5 0.3   ALKPHOS 75 67 62   AST 12 10 25   ALT 8 6 18     All pertinent labs within the past 24 hours have been reviewed.    Significant Imaging: I have reviewed all pertinent imaging results/findings.     Assessment/Plan:     Active Diagnoses:    Diagnosis Date Noted POA    PRINCIPAL PROBLEM:  Closed fracture of right distal tibia [S82.301A] 09/05/2024 Yes    Fall down stairs [W10.8XXA] 09/05/2024 Yes      Problems Resolved During this Admission:   33YO F Here following a fall  POD #2 IMN/ORIF right distal tibia    Diet: As tolerated  Pain: multimodal PRN continue as current on discharge  DVT: Lovenox;  OK for ASA 81mg BID on DC  ABX: periop ancef complete  PT/OT:  Continue to mobilize prior to discharge today.  Doing very well  Activity: NWB RLE; ok for full ROM, okay to come out of the boot for range-of-motion exercises discussed wearing the boot which he is not mobilizing or doing activity and at night.  Dressing changes begin to today and daily here after. May cover with soft dressing or large band aid. Keep clean and dry. No  showers to POD 7. Do not submerge. Do not apply ointments or creams.  Follow up with Mili Machado PA-C/ Dr. Yifan Underwood in clinic in 3 weeks for wound check and repeat evaluation.    Labs are stable today. she is ortho stable for discharge home at this time.  Discussed calling our office for follow up and refill of medications.       The above findings, diagnostics, and treatment plan were discussed with Dr Underwood who is in agreement with the plan of care except as stated in additional documentation.      Mili Machado PA-C   Orthopedic Trauma Surgery  [facility]

## 2024-09-07 NOTE — TERTIARY TRAUMA SURVEY NOTE
TERTIARY TRAUMA SURVEY (TTS)    List Injuries Identified to Date:   1. Closed fracture of right distal tibia    []Problems list reviewed  List Operations and Procedures:   1. IMN, ORIF of R tibia    Past Surgical History:   Procedure Laterality Date    INSERTION OF INTRAMEDULLARY NAIL INTO TIBIA Right 9/5/2024    Procedure: INSERTION, INTRAMEDULLARY JESIKA, TIBIA;  Surgeon: Yifan Underwood DO;  Location: [facility];  Service: Orthopedics;  Laterality: Right;  supine vascular bone foam c arm nail plate, small CCHS       Incidental findings:   1. Left elbow ecchymosis    Past Medical History:   1. None    Active Ambulatory Problems     Diagnosis Date Noted    No Active Ambulatory Problems     Resolved Ambulatory Problems     Diagnosis Date Noted    No Resolved Ambulatory Problems     No Additional Past Medical History     History reviewed. No pertinent past medical history.    Tertiary Physical Exam:     Physical Exam  Constitutional:       General: She is not in acute distress.     Appearance: She is not toxic-appearing.   HENT:      Head: Normocephalic and atraumatic.   Eyes:      Extraocular Movements: Extraocular movements intact.   Cardiovascular:      Rate and Rhythm: Normal rate.   Pulmonary:      Effort: Pulmonary effort is normal. No respiratory distress.   Abdominal:      Palpations: Abdomen is soft.      Tenderness: There is no abdominal tenderness.   Musculoskeletal:      Cervical back: Normal range of motion and neck supple.      Comments: Walking boot in place to RLE.  Surgical dressing CDI.  Sensation intact diffusely.  Cap refill <2 seconds   Skin:     General: Skin is warm and dry.      Capillary Refill: Capillary refill takes less than 2 seconds.   Neurological:      Mental Status: She is alert and oriented to person, place, and time.   Psychiatric:         Mood and Affect: Mood normal.         Behavior: Behavior normal.         Imaging Review:     Imaging Results               CT Ankle (Including  Hindfoot) Without Contrast Right (Final result)  Result time 09/05/24 12:31:00      Final result by Yogi Thornton MD (09/05/24 12:31:00)                   Narrative:    EXAMINATION  CT ANKLE (INCLUDING HINDFOOT) WITHOUT CONTRAST RIGHT    CLINICAL HISTORY  Fracture, ankle;    TECHNIQUE  Non-contrast helical-acquisition CT images of the right ankle were obtained.  Multiplanar reconstructions accomplished by a CT technologist at a separate workstation, pushed to PACS for physician review.    COMPARISON  Radiographs obtained earlier the same day.    FINDINGS  Images were reviewed in bone and soft tissue windows.    Exam quality: adequate for evaluation    Oblique, comminuted fracture with minimal fragment displacement again appreciated through the distal tibial diametaphysis, extending to the posterior malleolus articular surface.  The distal shaft component is displaced posteriorly approximately 4 mm.  No other convincing acute osseous disruption is identified.  Visualized joints are congruent.  There is no periosteal reaction or destructive skeletal lesion.    Diffuse periarticular subcutaneous edema is present, as well as appearance of disorganized hematoma.  No expansile hematoma or drainable fluid collection is identified.  Within limitations of non-contrast CT protocol, there is no convincing focal muscular abnormality or high-grade tendon disruption.  No gross tendon displacement or evidence of fracture entrapment is identified.    IMPRESSION  1. Minimally displaced comminuted fracture of the distal tibial diametaphysis.  Posterior intra-articular extension noted.  2. No other convincing acute osseous disruption.  ==========  This report was flagged in Epic as abnormal.      RADIATION DOSE  Automated tube current modulation, weight-based exposure dosing, and/or iterative reconstruction technique utilized to reach lowest reasonably achievable exposure rate.    DLP: 246 mGy*cm      Electronically signed  by: Yogi Thornton  Date:    09/05/2024  Time:    12:31                                     X-Ray Tibia Fibula 2 View Right (Final result)  Result time 09/05/24 10:06:02      Final result by Rohan Hdez MD (09/05/24 10:06:02)                   Impression:      As above.      Electronically signed by: Rohan Hedz  Date:    09/05/2024  Time:    10:06               Narrative:    EXAMINATION:  XR TIBIA FIBULA 2 VIEW RIGHT    CLINICAL HISTORY:  Other injury of unspecified body region, initial encounter    COMPARISON:  Earlier today    FINDINGS:  Two views right tibia and fibula.  Fractures of the tibia and fibula slightly improved position and alignment                                       X-Ray Ankle Complete Right (Final result)  Result time 09/05/24 11:29:00      Final result by Kim Pierce MD (09/05/24 11:29:00)                   Impression:      Distal tibia fracture.      Electronically signed by: Kim Pierce  Date:    09/05/2024  Time:    11:29               Narrative:    EXAMINATION:  XR ANKLE COMPLETE 3 VIEW RIGHT    CLINICAL HISTORY:  Unspecified fall, initial encounter    TECHNIQUE:  AP, lateral, and oblique images of the right ankle were performed.    COMPARISON:  None.    FINDINGS:  There is an obliquely oriented fracture through the distal metadiaphysis of the tibia with 5 mm posterolateral displacement of the distal fracture fragment.  Nondisplaced fracture plane appears to extend to the posterior malleolus.  Distal tibiofibular alignment is normal.  The ankle mortise is symmetric.    Soft tissue swelling.                                       X-Ray Tibia Fibula 2 View Right (Final result)  Result time 09/05/24 10:56:09      Final result by Kim Pierce MD (09/05/24 10:56:09)                   Impression:      1. Fracture of the proximal diaphysis of the fibula  2. Fracture of the distal metadiaphysis of the tibia.  3. Nondisplaced posterior malleolar  fracture.      Electronically signed by: Kim Pierce  Date:    09/05/2024  Time:    10:56               Narrative:    EXAMINATION:  XR TIBIA FIBULA 2 VIEW RIGHT    CLINICAL HISTORY:  Unspecified fall, initial encounter    TECHNIQUE:  AP and lateral views of the right tibia and fibula were performed.    COMPARISON:  None.    FINDINGS:  Obliquely oriented fracture of the proximal diaphysis of the fibula with 12 mm anterolateral displacement of the distal fracture fragment.  Obliquely oriented fracture of the distal metaphysis of the tibia with approximately 5 mm posterior displacement.  Probable subtle posterior malleolar fracture, nondisplaced.  No true frontal view of the distal tibia and fibula obtained.  This limits evaluation of tibiofibular alignment.    Soft tissue swelling.                                       X-Ray Knee 1 or 2 View Right (Final result)  Result time 09/05/24 10:21:58   Procedure changed from X-Ray Knee Complete 4 Or More Views Right     Final result by Kennedy Collins MD (09/05/24 10:21:58)                   Impression:      No acute osseous abnormality identified.      Electronically signed by: Kennedy Collins  Date:    09/05/2024  Time:    10:21               Narrative:    EXAMINATION:  XR KNEE 1 OR 2 VIEW RIGHT    CLINICAL HISTORY:  pain;Unspecified fall, initial encounter    TECHNIQUE:  Two-view    COMPARISON:  None available.    FINDINGS:  The osseous and articular surfaces are unremarkable.  There is no acute fracture, dislocation or arthritic change.  Position and alignment are satisfactory.  There is unremarkable mineralization of the bones.  No soft tissue calcifications identified.                                       Lab Review:   CBC:  Recent Labs   Lab Result Units 09/05/24  1058 09/06/24  0420 09/07/24  0407   WBC x10(3)/mcL 13.86* 12.54* 9.64   RBC x10(6)/mcL 5.07 4.40 4.08*   Hgb g/dL 12.8 11.0* 10.3*   Hct % 39.6 34.6* 32.0*   Platelet x10(3)/mcL 317 310 258   MCV fL  "78.1* 78.6* 78.4*   MCH pg 25.2* 25.0* 25.2*   MCHC g/dL 32.3* 31.8* 32.2*       CMP:  Recent Labs   Lab Result Units 09/05/24  1058 09/06/24  0420 09/07/24  0407   Calcium mg/dL 8.9 8.6 8.1*   Albumin g/dL 3.6 3.2* 2.9*   Sodium mmol/L 142 139 138   Potassium mmol/L 3.9 4.4 3.7   CO2 mmol/L 21* 23 25   Chloride mmol/L 113* 109* 108*   Blood Urea Nitrogen mg/dL 7.8 5.0* 7.3   Creatinine mg/dL 0.74 0.72 0.72   ALP unit/L 75 67 62   ALT unit/L 8 6 18   AST unit/L 12 10 25   Bilirubin Total mg/dL 0.3 0.5 0.3       Troponin:  No results for input(s): "TROPONINI" in the last 2160 hours.    ETOH:  No results for input(s): "ETHANOL" in the last 72 hours.     Urine Drug Screen:  No results for input(s): "COCAINE", "OPIATE", "BARBITURATE", "AMPHETAMINE", "FENTANYL", "CANNABINOIDS", "MDMA" in the last 72 hours.    Invalid input(s): "BENZODIAZEPINE", "PHENCYCLIDINE"   Plan:   Faith Lee, 35 yo female, admitted following fall down stairs resulting in closed fracture of R distal tibia     Closed fracture R distal tibia  - s/p IMN/ORIF R distal tibia with ortho (Dr. Underwood) on 9/5/24  - NWB RLE, ok for full ROM  - multimodal pain control  - dvt ppx w lovenox 40 BID, ok for ASA 81 mg BID on DC  - PT/OT  - daily dressing changes   - will need follow-up in 3 weeks with Dr. Kj Joy MD  General Surgery PGY-1  [facility]     "

## 2024-09-07 NOTE — NURSING
Nurses Note -- 4 Eyes      9/7/2024   4:11 PM      Skin assessed during: Daily Assessment      [] No Altered Skin Integrity Present    [x]Prevention Measures Documented      [] Yes- Altered Skin Integrity Present or Discovered   [] LDA Added if Not in Epic (Describe Wound)   [] New Altered Skin Integrity was Present on Admit and Documented in LDA   [] Wound Image Taken  [x] Already in LDA    Wound Care Consulted? No    Attending Nurse:  Ninfa Hutchison RN     Second RN/Staff Member:  Shantelle Vance RN

## 2024-09-07 NOTE — DISCHARGE SUMMARY
[facility]   Trauma  Discharge Summary      Patient Name: Faith Lee  MRN: [id number]  Admission Date: 9/5/2024  Hospital Length of Stay: 2 days  Discharge Date and Time:  09/07/2024 1:52 PM  Attending Physician: Jarret Scott MD   Discharging Provider: Lisa Keller MD  Primary Care Provider: Suri, Primary Doctor     HPI: Patient is a 34 year old female who was transferred from [facility] and reports she slipped and fell down wet stairs while it was raining. She sustained a right distal tibia fracture and was transferred for Orthopedic intervention.      Procedure(s) (LRB):  INSERTION, INTRAMEDULLARY JESIKA, TIBIA (Right)     Hospital Course: Admitted to trauma service 9/5/24. Underwent IMN and ORIF R Tibia 9/5 with ortho. Evaluated by PT/OT 9/6. Deemed stable for discharge with home health 9/7/24    Goals of Care Treatment Preferences:  Code Status: Full Code    Consults:   Consults (From admission, onward)          Status Ordering Provider     Inpatient consult to Social Work/Case Management  Once        Provider:  (Not yet assigned)    Acknowledged EDWIN MANLEY     Inpatient consult to Social Work/Case Management  Once        Provider:  (Not yet assigned)    Acknowledged ELIJAH MADRIGAL     Inpatient consult to Orthopedic Surgery  Once        Provider:  Anam Oliver Jr., MD    Completed JAYMIE SOLANO            Significant Diagnostic Studies: Labs: CMP   Recent Labs   Lab 09/06/24  0420 09/07/24  0407    138   K 4.4 3.7   * 108*   CO2 23 25   BUN 5.0* 7.3   CREATININE 0.72 0.72   CALCIUM 8.6 8.1*   ALBUMIN 3.2* 2.9*   BILITOT 0.5 0.3   ALKPHOS 67 62   AST 10 25   ALT 6 18   , CBC   Recent Labs   Lab 09/06/24  0420 09/07/24  0407   WBC 12.54* 9.64   HGB 11.0* 10.3*   HCT 34.6* 32.0*    258   , and All labs within the past 24 hours have been reviewed    Imaging Results               CT Ankle (Including Hindfoot) Without Contrast Right (Final result)  Result time  09/05/24 12:31:00      Final result by Yogi Thornton MD (09/05/24 12:31:00)                   Narrative:    EXAMINATION  CT ANKLE (INCLUDING HINDFOOT) WITHOUT CONTRAST RIGHT    CLINICAL HISTORY  Fracture, ankle;    TECHNIQUE  Non-contrast helical-acquisition CT images of the right ankle were obtained.  Multiplanar reconstructions accomplished by a CT technologist at a separate workstation, pushed to PACS for physician review.    COMPARISON  Radiographs obtained earlier the same day.    FINDINGS  Images were reviewed in bone and soft tissue windows.    Exam quality: adequate for evaluation    Oblique, comminuted fracture with minimal fragment displacement again appreciated through the distal tibial diametaphysis, extending to the posterior malleolus articular surface.  The distal shaft component is displaced posteriorly approximately 4 mm.  No other convincing acute osseous disruption is identified.  Visualized joints are congruent.  There is no periosteal reaction or destructive skeletal lesion.    Diffuse periarticular subcutaneous edema is present, as well as appearance of disorganized hematoma.  No expansile hematoma or drainable fluid collection is identified.  Within limitations of non-contrast CT protocol, there is no convincing focal muscular abnormality or high-grade tendon disruption.  No gross tendon displacement or evidence of fracture entrapment is identified.    IMPRESSION  1. Minimally displaced comminuted fracture of the distal tibial diametaphysis.  Posterior intra-articular extension noted.  2. No other convincing acute osseous disruption.  ==========  This report was flagged in Epic as abnormal.      RADIATION DOSE  Automated tube current modulation, weight-based exposure dosing, and/or iterative reconstruction technique utilized to reach lowest reasonably achievable exposure rate.    DLP: 246 mGy*cm      Electronically signed by: Ygoi Thornton  Date:    09/05/2024  Time:    12:31                                      X-Ray Tibia Fibula 2 View Right (Final result)  Result time 09/05/24 10:06:02      Final result by Rohan Hdez MD (09/05/24 10:06:02)                   Impression:      As above.      Electronically signed by: Rohan Hdez  Date:    09/05/2024  Time:    10:06               Narrative:    EXAMINATION:  XR TIBIA FIBULA 2 VIEW RIGHT    CLINICAL HISTORY:  Other injury of unspecified body region, initial encounter    COMPARISON:  Earlier today    FINDINGS:  Two views right tibia and fibula.  Fractures of the tibia and fibula slightly improved position and alignment                                       X-Ray Ankle Complete Right (Final result)  Result time 09/05/24 11:29:00      Final result by Kim Pierce MD (09/05/24 11:29:00)                   Impression:      Distal tibia fracture.      Electronically signed by: Kim Pierce  Date:    09/05/2024  Time:    11:29               Narrative:    EXAMINATION:  XR ANKLE COMPLETE 3 VIEW RIGHT    CLINICAL HISTORY:  Unspecified fall, initial encounter    TECHNIQUE:  AP, lateral, and oblique images of the right ankle were performed.    COMPARISON:  None.    FINDINGS:  There is an obliquely oriented fracture through the distal metadiaphysis of the tibia with 5 mm posterolateral displacement of the distal fracture fragment.  Nondisplaced fracture plane appears to extend to the posterior malleolus.  Distal tibiofibular alignment is normal.  The ankle mortise is symmetric.    Soft tissue swelling.                                       X-Ray Tibia Fibula 2 View Right (Final result)  Result time 09/05/24 10:56:09      Final result by Kim Pierce MD (09/05/24 10:56:09)                   Impression:      1. Fracture of the proximal diaphysis of the fibula  2. Fracture of the distal metadiaphysis of the tibia.  3. Nondisplaced posterior malleolar fracture.      Electronically signed by: Kim Pierce  Date:    09/05/2024  Time:    10:56                Narrative:    EXAMINATION:  XR TIBIA FIBULA 2 VIEW RIGHT    CLINICAL HISTORY:  Unspecified fall, initial encounter    TECHNIQUE:  AP and lateral views of the right tibia and fibula were performed.    COMPARISON:  None.    FINDINGS:  Obliquely oriented fracture of the proximal diaphysis of the fibula with 12 mm anterolateral displacement of the distal fracture fragment.  Obliquely oriented fracture of the distal metaphysis of the tibia with approximately 5 mm posterior displacement.  Probable subtle posterior malleolar fracture, nondisplaced.  No true frontal view of the distal tibia and fibula obtained.  This limits evaluation of tibiofibular alignment.    Soft tissue swelling.                                       X-Ray Knee 1 or 2 View Right (Final result)  Result time 09/05/24 10:21:58   Procedure changed from X-Ray Knee Complete 4 Or More Views Right     Final result by Kennedy Collins MD (09/05/24 10:21:58)                   Impression:      No acute osseous abnormality identified.      Electronically signed by: Kennedy Collins  Date:    09/05/2024  Time:    10:21               Narrative:    EXAMINATION:  XR KNEE 1 OR 2 VIEW RIGHT    CLINICAL HISTORY:  pain;Unspecified fall, initial encounter    TECHNIQUE:  Two-view    COMPARISON:  None available.    FINDINGS:  The osseous and articular surfaces are unremarkable.  There is no acute fracture, dislocation or arthritic change.  Position and alignment are satisfactory.  There is unremarkable mineralization of the bones.  No soft tissue calcifications identified.                                        Pending Diagnostic Studies:       Procedure Component Value Units Date/Time    Pregnancy, urine rapid [[id number]]     Order Status: Sent Lab Status: No result     Specimen: Urine           Final Active Diagnoses:    Diagnosis Date Noted POA    PRINCIPAL PROBLEM:  Closed fracture of right distal tibia [S82.301A] 09/05/2024 Yes    Fall down stairs [W10.8XXA]  09/05/2024 Yes      Problems Resolved During this Admission:      Discharged Condition: stable    Disposition: Home or Self Care    Follow Up:   Contact information for follow-up providers       Mili Machado PA-C. Schedule an appointment as soon as possible for a visit.    Specialty: Orthopedic Surgery  Why: For wound check and suture/staple removal post op, With Dr Lopezs PA, For follow up X-rays  Contact information:  [address] [facility] [facility]  [address]  [place] [facility] [address]  [phone]               DISCHARGING NURSE/ Follow up.    Contact information:  Please call referral line at [phone] to assist with PCP arrangements for primary care and hospital f/u. Thanks!                     Contact information for after-discharge care       Home Medical Care       [employer] .    Service: Home Health Services  Contact information:  [address]  [facility] [id number] [phone]                                 Patient Instructions:      Weight bearing restrictions (specify):   Order Comments: Nonweightbearing to right leg. Ok for full range of motion.     Medications:  Reconciled Home Medications:      Medication List        START taking these medications      aspirin 81 MG EC tablet  Commonly known as: ECOTRIN  Take 1 tablet (81 mg total) by mouth 2 (two) times a day.     gabapentin 300 MG capsule  Commonly known as: NEURONTIN  Take 1 capsule (300 mg total) by mouth 3 (three) times daily. for 10 days     methocarbamoL 500 MG Tab  Commonly known as: ROBAXIN  Take 1 tablet (500 mg total) by mouth every 8 (eight) hours. for 10 days     ondansetron 4 MG Tbdl  Commonly known as: ZOFRAN-ODT  Take 1 tablet (4 mg total) by mouth every 6 (six) hours as needed (nausea and vomiting).     oxyCODONE-acetaminophen  mg per tablet  Commonly known as: PERCOCET  Take 1 tablet by mouth every 4 (four) hours as needed for Pain.              Lisa Keller MD  [facility] General Surgery  PGY-1  9/7/24

## 2024-09-07 NOTE — PLAN OF CARE
Pt maintained free from falls/trauma/injuries and skin breakdown. Pt denied pain or discomfort. Plan of care reviewed. Pt verbalized understanding. All questions and concerns addressed.       Problem: Adult Inpatient Plan of Care  Goal: Plan of Care Review  Outcome: Adequate for Care Transition  Flowsheets (Taken 9/7/2024 1600)  Plan of Care Reviewed With: patient  Goal: Patient-Specific Goal (Individualized)  Outcome: Adequate for Care Transition  Goal: Absence of Hospital-Acquired Illness or Injury  Outcome: Adequate for Care Transition  Goal: Optimal Comfort and Wellbeing  Outcome: Adequate for Care Transition  Goal: Readiness for Transition of Care  Outcome: Adequate for Care Transition     Problem: Wound  Goal: Optimal Coping  Outcome: Adequate for Care Transition  Goal: Optimal Functional Ability  Outcome: Adequate for Care Transition  Goal: Absence of Infection Signs and Symptoms  Outcome: Adequate for Care Transition  Goal: Improved Oral Intake  Outcome: Adequate for Care Transition  Goal: Optimal Pain Control and Function  Outcome: Adequate for Care Transition  Goal: Skin Health and Integrity  Outcome: Adequate for Care Transition  Goal: Optimal Wound Healing  Outcome: Adequate for Care Transition     Problem: Skin Injury Risk Increased  Goal: Skin Health and Integrity  Outcome: Adequate for Care Transition     Problem: Fall Injury Risk  Goal: Absence of Fall and Fall-Related Injury  Outcome: Adequate for Care Transition

## 2024-09-09 PROCEDURE — G0180 MD CERTIFICATION HHA PATIENT: HCPCS | Mod: ,,, | Performed by: NURSE PRACTITIONER

## 2024-09-10 DIAGNOSIS — S82.301D CLOSED FRACTURE OF DISTAL END OF RIGHT TIBIA WITH ROUTINE HEALING, UNSPECIFIED FRACTURE MORPHOLOGY, SUBSEQUENT ENCOUNTER: ICD-10-CM

## 2024-09-10 DIAGNOSIS — S82.301D CLOSED FRACTURE OF DISTAL END OF RIGHT TIBIA WITH ROUTINE HEALING, UNSPECIFIED FRACTURE MORPHOLOGY, SUBSEQUENT ENCOUNTER: Primary | ICD-10-CM

## 2024-09-10 RX ORDER — OXYCODONE AND ACETAMINOPHEN 10; 325 MG/1; MG/1
1 TABLET ORAL EVERY 6 HOURS PRN
Qty: 28 TABLET | Refills: 0 | Status: SHIPPED | OUTPATIENT
Start: 2024-09-10 | End: 2024-09-17

## 2024-09-10 RX ORDER — OXYCODONE AND ACETAMINOPHEN 10; 325 MG/1; MG/1
1 TABLET ORAL EVERY 6 HOURS PRN
Qty: 28 TABLET | Refills: 0 | Status: SHIPPED | OUTPATIENT
Start: 2024-09-10 | End: 2024-09-10 | Stop reason: SDUPTHER

## 2024-09-12 DIAGNOSIS — S82.301D CLOSED FRACTURE OF DISTAL END OF RIGHT TIBIA WITH ROUTINE HEALING, UNSPECIFIED FRACTURE MORPHOLOGY, SUBSEQUENT ENCOUNTER: Primary | ICD-10-CM

## 2024-09-12 RX ORDER — ONDANSETRON 4 MG/1
4 TABLET, ORALLY DISINTEGRATING ORAL EVERY 6 HOURS PRN
Qty: 15 TABLET | Refills: 0 | Status: SHIPPED | OUTPATIENT
Start: 2024-09-12 | End: 2024-09-19

## 2024-09-19 DIAGNOSIS — S82.301D CLOSED FRACTURE OF DISTAL END OF RIGHT TIBIA WITH ROUTINE HEALING, UNSPECIFIED FRACTURE MORPHOLOGY, SUBSEQUENT ENCOUNTER: ICD-10-CM

## 2024-09-20 RX ORDER — GABAPENTIN 300 MG/1
300 CAPSULE ORAL 3 TIMES DAILY
Qty: 30 CAPSULE | Refills: 0 | Status: SHIPPED | OUTPATIENT
Start: 2024-09-20 | End: 2024-09-30

## 2024-09-20 RX ORDER — OXYCODONE AND ACETAMINOPHEN 10; 325 MG/1; MG/1
1 TABLET ORAL EVERY 6 HOURS PRN
Qty: 28 TABLET | Refills: 0 | Status: SHIPPED | OUTPATIENT
Start: 2024-09-20 | End: 2024-09-27

## 2024-09-20 RX ORDER — METHOCARBAMOL 500 MG/1
500 TABLET, FILM COATED ORAL EVERY 8 HOURS
Qty: 30 TABLET | Refills: 0 | Status: SHIPPED | OUTPATIENT
Start: 2024-09-20 | End: 2024-09-30

## 2024-09-20 RX ORDER — ONDANSETRON 4 MG/1
4 TABLET, ORALLY DISINTEGRATING ORAL EVERY 6 HOURS PRN
Qty: 15 TABLET | Refills: 0 | Status: SHIPPED | OUTPATIENT
Start: 2024-09-20 | End: 2024-09-27

## 2024-09-26 ENCOUNTER — TELEPHONE (OUTPATIENT)
Dept: ORTHOPEDICS | Facility: CLINIC | Age: 34
End: 2024-09-26
Payer: MEDICAID

## 2024-09-26 DIAGNOSIS — S82.301D CLOSED FRACTURE OF DISTAL END OF RIGHT TIBIA WITH ROUTINE HEALING, UNSPECIFIED FRACTURE MORPHOLOGY, SUBSEQUENT ENCOUNTER: Primary | ICD-10-CM

## 2024-09-26 RX ORDER — SULFAMETHOXAZOLE AND TRIMETHOPRIM 800; 160 MG/1; MG/1
1 TABLET ORAL 2 TIMES DAILY
Qty: 20 TABLET | Refills: 0 | Status: SHIPPED | OUTPATIENT
Start: 2024-09-26 | End: 2024-10-06

## 2024-09-26 RX ORDER — SULFAMETHOXAZOLE AND TRIMETHOPRIM 800; 160 MG/1; MG/1
1 TABLET ORAL 2 TIMES DAILY
Qty: 20 TABLET | Refills: 0 | Status: CANCELLED | OUTPATIENT
Start: 2024-09-26 | End: 2024-10-06

## 2024-09-26 NOTE — TELEPHONE ENCOUNTER
Received call from home health regarding concern for increase redness to incision. Denies fever or chills. Currently taking aspirin bid. Image reviewed by provider. Script for Bactrim sent to confirmed pharmacy. Patient does have upcoming appointment, and will report to ER if symptoms worsen.

## 2024-09-30 ENCOUNTER — HOSPITAL ENCOUNTER (OUTPATIENT)
Dept: RADIOLOGY | Facility: CLINIC | Age: 34
Discharge: HOME | End: 2024-09-30
Attending: PHYSICIAN ASSISTANT
Payer: MEDICAID

## 2024-09-30 ENCOUNTER — OFFICE VISIT (OUTPATIENT)
Dept: ORTHOPEDICS | Facility: CLINIC | Age: 34
End: 2024-09-30
Payer: MEDICAID

## 2024-09-30 VITALS
WEIGHT: 250 LBS | BODY MASS INDEX: 35.79 KG/M2 | SYSTOLIC BLOOD PRESSURE: 122 MMHG | DIASTOLIC BLOOD PRESSURE: 80 MMHG | HEART RATE: 120 BPM | HEIGHT: 70 IN

## 2024-09-30 DIAGNOSIS — S82.301D CLOSED FRACTURE OF DISTAL END OF RIGHT TIBIA WITH ROUTINE HEALING, UNSPECIFIED FRACTURE MORPHOLOGY, SUBSEQUENT ENCOUNTER: Primary | ICD-10-CM

## 2024-09-30 DIAGNOSIS — S82.301D CLOSED FRACTURE OF DISTAL END OF RIGHT TIBIA WITH ROUTINE HEALING, UNSPECIFIED FRACTURE MORPHOLOGY, SUBSEQUENT ENCOUNTER: ICD-10-CM

## 2024-09-30 PROCEDURE — 1159F MED LIST DOCD IN RCRD: CPT | Mod: CPTII,,, | Performed by: PHYSICIAN ASSISTANT

## 2024-09-30 PROCEDURE — 99024 POSTOP FOLLOW-UP VISIT: CPT | Mod: ,,, | Performed by: PHYSICIAN ASSISTANT

## 2024-09-30 PROCEDURE — 73590 X-RAY EXAM OF LOWER LEG: CPT | Mod: RT,,, | Performed by: PHYSICIAN ASSISTANT

## 2024-09-30 PROCEDURE — 3074F SYST BP LT 130 MM HG: CPT | Mod: CPTII,,, | Performed by: PHYSICIAN ASSISTANT

## 2024-09-30 PROCEDURE — 3079F DIAST BP 80-89 MM HG: CPT | Mod: CPTII,,, | Performed by: PHYSICIAN ASSISTANT

## 2024-09-30 PROCEDURE — 73610 X-RAY EXAM OF ANKLE: CPT | Mod: RT,,, | Performed by: PHYSICIAN ASSISTANT

## 2024-09-30 NOTE — PROGRESS NOTES
"Subjective:       Patient ID: Faith Lee is a 34 y.o. female.  Chief Complaint   Patient presents with    Post-op Evaluation     3.5 wks, IMN & ORIF RIGHT DISTAL TIBIA FX 9-5-24/GL 12-4-24, nwb, ambulates with crutches, presents with boot, does have some redness around incision, incision still having some drainage,         HPI    Patient 3.5 weeks post op IMN/ORIF right distal tibia. Doing well overall. NWB RLE. Incision with some erythema last week, on bactrim. Improving some. All incisions have some erythema. May have allergy to sutures. She does endorse pruritus. Some weepy drainage around the distal incisions. Otherwise healing well. Continue bactrim. In need of medication refills today.  No significant numbness or tingling distally.    ROS:  Constitutional: Denies fever chills  Eyes: No change in vision  ENT: No ringing or current infections  CV: No chest pain  Resp: No labored breathing  MSK: Pain evident at site of injury located in HPI,   Integ: No signs of abrasions or lacerations  Neuro: No numbness or tingling  Lymphatic: No swelling outside the area of injury     Current Outpatient Medications on File Prior to Visit   Medication Sig Dispense Refill    aspirin (ECOTRIN) 81 MG EC tablet Take 1 tablet (81 mg total) by mouth 2 (two) times a day. 60 tablet 0    gabapentin (NEURONTIN) 300 MG capsule Take 1 capsule (300 mg total) by mouth 3 (three) times daily. for 10 days 30 capsule 0    methocarbamoL (ROBAXIN) 500 MG Tab Take 1 tablet (500 mg total) by mouth every 8 (eight) hours. for 10 days 30 tablet 0    sulfamethoxazole-trimethoprim 800-160mg (BACTRIM DS) 800-160 mg Tab Take 1 tablet by mouth 2 (two) times daily. for 10 days 20 tablet 0     No current facility-administered medications on file prior to visit.          Objective:      /80   Pulse (!) 120   Ht 5' 10" (1.778 m)   Wt 113.4 kg (250 lb)   LMP 08/22/2024 Comment: IUD  BMI 35.87 kg/m²   Physical Exam  General the patient is alert and " "oriented x3 no acute distress nontoxic-appearing appropriate affect.    Constitutional: Vital signs are examined and stable.  Resp: No signs of labored breathing             RLE: -Skin: mild erythema around incisions, no dehiscence, sutures and staples removed.            -MSK: Hip and Knee F/E, EHL/FHL, Gastroc/Tib anterior Strength 5/5           -Neuro:  Sensation intact to light touch L3-S1 dermatomes           -Lymphatic: No signs of lymphadenopathy           -CV: Capillary refill is less than 2 seconds.            Body mass index is 35.87 kg/m².  Ideal body weight: 68.5 kg (151 lb 0.2 oz)  Adjusted ideal body weight: 86.5 kg (190 lb 9.8 oz)  No results found for: "HGBA1C"  Hgb   Date Value Ref Range Status   09/07/2024 10.3 (L) 12.0 - 16.0 g/dL Final   09/06/2024 11.0 (L) 12.0 - 16.0 g/dL Final     Hct   Date Value Ref Range Status   09/07/2024 32.0 (L) 37.0 - 47.0 % Final   09/06/2024 34.6 (L) 37.0 - 47.0 % Final     No results found for: "IRON"  No components found for: "FROLATE"  No results found for: "HLZQBRKA97CG"  WBC   Date Value Ref Range Status   09/07/2024 9.64 4.50 - 11.50 x10(3)/mcL Final   09/06/2024 12.54 (H) 4.50 - 11.50 x10(3)/mcL Final       Radiology: 3 view x ray right ankle: hardware intact without loosening or failure. Stable alignment. No consolidation as expected in the acute post operative period.     3 view x ray tibia and fibula right: intramedullary nail in place. Stable alignment distal tibia. All hardware intact.         Assessment:         1. Closed fracture of distal end of right tibia with routine healing, unspecified fracture morphology, subsequent encounter  X-Ray Ankle Complete Right    X-Ray Tibia Fibula 2 View Right              Plan:         Follow up in about 2 weeks (around 10/14/2024).    Faith was seen today for post-op evaluation.    Diagnoses and all orders for this visit:    Closed fracture of distal end of right tibia with routine healing, unspecified fracture " morphology, subsequent encounter  -     X-Ray Ankle Complete Right; Future  -     X-Ray Tibia Fibula 2 View Right; Future        -Bactrim sent to pharmacy Thursday last week. Continue taking to completion. Follow up for wound check in approx 2 weeks.   -All sutures and staples removed today. I believe she may be having  a reaction tot he staples and sutures. Continue strict elevation of the right lower extremity.   - Remain non weight bearing to 8-10 weeks post op. We will see her back in 2 weeks for wound check only. No repeat x rays needed at that time.  -ED precautions given    The above findings, diagnostics, and treatment plan were discussed with Dr. Underwood who is in agreement with the plan of care except as stated in additional documentation.       Mili Machado PA-C          Future Appointments   Date Time Provider Department Center   10/14/2024  9:00 AM Yifan Underwood DO [place] [facility]

## 2024-10-03 ENCOUNTER — EXTERNAL HOME HEALTH (OUTPATIENT)
Dept: HOME HEALTH SERVICES | Facility: HOSPITAL | Age: 34
End: 2024-10-03
Payer: MEDICAID

## 2024-10-14 ENCOUNTER — OFFICE VISIT (OUTPATIENT)
Dept: ORTHOPEDICS | Facility: CLINIC | Age: 34
End: 2024-10-14
Payer: MEDICAID

## 2024-10-14 VITALS
DIASTOLIC BLOOD PRESSURE: 75 MMHG | BODY MASS INDEX: 35.79 KG/M2 | SYSTOLIC BLOOD PRESSURE: 124 MMHG | WEIGHT: 250 LBS | HEART RATE: 97 BPM | HEIGHT: 70 IN

## 2024-10-14 DIAGNOSIS — S82.301D CLOSED FRACTURE OF DISTAL END OF RIGHT TIBIA WITH ROUTINE HEALING, UNSPECIFIED FRACTURE MORPHOLOGY, SUBSEQUENT ENCOUNTER: Primary | ICD-10-CM

## 2024-10-14 PROCEDURE — 3074F SYST BP LT 130 MM HG: CPT | Mod: CPTII,,, | Performed by: PHYSICIAN ASSISTANT

## 2024-10-14 PROCEDURE — 3078F DIAST BP <80 MM HG: CPT | Mod: CPTII,,, | Performed by: PHYSICIAN ASSISTANT

## 2024-10-14 PROCEDURE — 99024 POSTOP FOLLOW-UP VISIT: CPT | Mod: ,,, | Performed by: PHYSICIAN ASSISTANT

## 2024-10-14 PROCEDURE — 1159F MED LIST DOCD IN RCRD: CPT | Mod: CPTII,,, | Performed by: PHYSICIAN ASSISTANT

## 2024-10-14 NOTE — PROGRESS NOTES
"Subjective:       Patient ID: Faith Lee is a 34 y.o. female.  Chief Complaint   Patient presents with    Follow-up     5.5 wks, WOUND CHECK; IMN ORIF RIGHT DISTAL TIBIA FX 9-5-24/GL 12-4-24, ambulates with crutches, denies pain, incisions looks good, has some scabbing, no other complaints,        HPI    Patient 5.5 weeks post op IMN/ORIF right distal tibia. Doing well overall. NWB RLE. Here today for wound check. Admits to putting some weight on the leg since the last visit. Incision is healing well and improving today, Erythema improving.  No significant numbness or tingling distally.    ROS:  Constitutional: Denies fever chills  Eyes: No change in vision  ENT: No ringing or current infections  CV: No chest pain  Resp: No labored breathing  MSK: Pain evident at site of injury located in HPI,   Integ: No signs of abrasions or lacerations  Neuro: No numbness or tingling  Lymphatic: No swelling outside the area of injury     Current Outpatient Medications on File Prior to Visit   Medication Sig Dispense Refill    aspirin (ECOTRIN) 81 MG EC tablet Take 1 tablet (81 mg total) by mouth 2 (two) times a day. 60 tablet 0    gabapentin (NEURONTIN) 300 MG capsule Take 1 capsule (300 mg total) by mouth 3 (three) times daily. for 10 days 30 capsule 0     No current facility-administered medications on file prior to visit.          Objective:      /75   Pulse 97   Ht 5' 10" (1.778 m)   Wt 113.4 kg (250 lb)   BMI 35.87 kg/m²   Physical Exam  General the patient is alert and oriented x3 no acute distress nontoxic-appearing appropriate affect.    Constitutional: Vital signs are examined and stable.  Resp: No signs of labored breathing             RLE: -Skin: Erythema improved, no dehiscence, no drainage           -MSK: Hip and Knee F/E, EHL/FHL, Gastroc/Tib anterior Strength 5/5           -Neuro:  Sensation intact to light touch L3-S1 dermatomes           -Lymphatic: No signs of lymphadenopathy           -CV: Capillary " "refill is less than 2 seconds.            Body mass index is 35.87 kg/m².  Ideal body weight: 68.5 kg (151 lb 0.2 oz)  Adjusted ideal body weight: 86.5 kg (190 lb 9.8 oz)  No results found for: "HGBA1C"  Hgb   Date Value Ref Range Status   09/07/2024 10.3 (L) 12.0 - 16.0 g/dL Final   09/06/2024 11.0 (L) 12.0 - 16.0 g/dL Final     Hct   Date Value Ref Range Status   09/07/2024 32.0 (L) 37.0 - 47.0 % Final   09/06/2024 34.6 (L) 37.0 - 47.0 % Final     No results found for: "IRON"  No components found for: "FROLATE"  No results found for: "BDTCFKDI53VG"  WBC   Date Value Ref Range Status   09/07/2024 9.64 4.50 - 11.50 x10(3)/mcL Final   09/06/2024 12.54 (H) 4.50 - 11.50 x10(3)/mcL Final       Radiology: 3 view x ray right ankle: hardware intact without loosening or failure. Stable alignment.    3 view x ray tibia and fibula right: intramedullary nail in place. Stable alignment distal tibia. All hardware intact.         Assessment:         1. Closed fracture of distal end of right tibia with routine healing, unspecified fracture morphology, subsequent encounter  Ambulatory referral/consult to Physical/Occupational Therapy              Plan:         Follow up in about 6 weeks (around 11/25/2024).    Faith was seen today for follow-up.    Diagnoses and all orders for this visit:    Closed fracture of distal end of right tibia with routine healing, unspecified fracture morphology, subsequent encounter  -     Ambulatory referral/consult to Physical/Occupational Therapy; Future        -Finish bactrim. Future PT order today to begin WB at 8 weeks post op.   -Recommend compression socks to help with swelling. Elevate through the day as much as possible.   - We will see her back in about 6 weeks for repeat x rays and evaluation.   - discussed risk of early weight bearing including hardware failure and malreduction and possible need for further surgery.  -ED precautions given    The above findings, diagnostics, and treatment " plan were discussed with Dr. Underwood who is in agreement with the plan of care except as stated in additional documentation.       Mili Machado PA-C          Future Appointments   Date Time Provider Department Center   11/21/2024 10:30 AM [facility] [place]

## 2024-10-20 ENCOUNTER — DOCUMENT SCAN (OUTPATIENT)
Dept: HOME HEALTH SERVICES | Facility: HOSPITAL | Age: 34
End: 2024-10-20
Payer: MEDICAID

## 2024-12-19 ENCOUNTER — OFFICE VISIT (OUTPATIENT)
Dept: ORTHOPEDICS | Facility: CLINIC | Age: 34
End: 2024-12-19
Payer: MEDICAID

## 2024-12-19 ENCOUNTER — HOSPITAL ENCOUNTER (OUTPATIENT)
Dept: RADIOLOGY | Facility: CLINIC | Age: 34
Discharge: HOME | End: 2024-12-19
Attending: ORTHOPAEDIC SURGERY
Payer: MEDICAID

## 2024-12-19 VITALS
DIASTOLIC BLOOD PRESSURE: 76 MMHG | HEART RATE: 97 BPM | WEIGHT: 249.13 LBS | SYSTOLIC BLOOD PRESSURE: 150 MMHG | BODY MASS INDEX: 35.66 KG/M2 | HEIGHT: 70 IN

## 2024-12-19 DIAGNOSIS — S82.301D CLOSED FRACTURE OF DISTAL END OF RIGHT TIBIA WITH ROUTINE HEALING, UNSPECIFIED FRACTURE MORPHOLOGY, SUBSEQUENT ENCOUNTER: Primary | ICD-10-CM

## 2024-12-19 DIAGNOSIS — S82.301D CLOSED FRACTURE OF DISTAL END OF RIGHT TIBIA WITH ROUTINE HEALING, UNSPECIFIED FRACTURE MORPHOLOGY, SUBSEQUENT ENCOUNTER: ICD-10-CM

## 2024-12-19 PROCEDURE — 99213 OFFICE O/P EST LOW 20 MIN: CPT | Mod: ,,, | Performed by: ORTHOPAEDIC SURGERY

## 2024-12-19 PROCEDURE — 3077F SYST BP >= 140 MM HG: CPT | Mod: CPTII,,, | Performed by: ORTHOPAEDIC SURGERY

## 2024-12-19 PROCEDURE — 3078F DIAST BP <80 MM HG: CPT | Mod: CPTII,,, | Performed by: ORTHOPAEDIC SURGERY

## 2024-12-19 PROCEDURE — 1159F MED LIST DOCD IN RCRD: CPT | Mod: CPTII,,, | Performed by: ORTHOPAEDIC SURGERY

## 2024-12-19 PROCEDURE — 3008F BODY MASS INDEX DOCD: CPT | Mod: CPTII,,, | Performed by: ORTHOPAEDIC SURGERY

## 2024-12-19 PROCEDURE — 73610 X-RAY EXAM OF ANKLE: CPT | Mod: RT,,, | Performed by: ORTHOPAEDIC SURGERY

## 2024-12-19 NOTE — PROGRESS NOTES
Subjective:       Patient ID: Faith Lee is a 34 y.o. female.  Chief Complaint   Patient presents with    Right Forearm - Follow-up     3.5 month f/u from IMN/ ORIF right distal tibia fx. No complaints.         HPI    History of Present Illness    HPI:  Ms. Lee presents for a follow-up visit regarding a leg injury, specifically a tibia and fibula fracture. Her leg is still healing but she can observe progress. She mentions having acquired a vehicle, which enables her to start physical therapy. Ms. Lee describes her gait as slightly impaired and inconsistent. She reports being able to ambulate in stores, but notes that it causes some pain and ankle swelling. Her affected leg and ankle are significantly larger than the unaffected side due to swelling. Ms. Lee expresses some concern about her healing progress. The injury involves both a tibia and fibula fracture, with the tibia fracture being treated with both a nail and a plate due to its location making it prone to slower healing. She denies needing a work note for today but requests a consultation for physical therapy.    IMAGING:  Ms. Lee underwent X-rays on the current date. The results show a cloud of bone that is expected to solidify, indicating signs of healing. However, the healing process appears to be 1-2 months behind typical tibia fractures. There is also a fibula fracture with uncertain healing prospects. The X-rays reveal mild arthritis in the joint, evidenced by a sharp corner. The joint shows visible cartilage space.    WORK STATUS:  Ms. Lee is currently off work but remains employed. She has requested a work note, which may be for the purpose of returning to work or extending her leave.      ROS:  Musculoskeletal: +joint pain           ROS:  Constitutional: Denies fever chills  Eyes: No change in vision  ENT: No ringing or current infections  CV: No chest pain  Resp: No labored breathing  MSK: Pain evident at site of injury located in  "HPI,   Integ: No signs of abrasions or lacerations  Neuro: No numbness or tingling  Lymphatic: No swelling outside the area of injury     Current Outpatient Medications on File Prior to Visit   Medication Sig Dispense Refill    aspirin (ECOTRIN) 81 MG EC tablet Take 1 tablet (81 mg total) by mouth 2 (two) times a day. 60 tablet 0    gabapentin (NEURONTIN) 300 MG capsule Take 1 capsule (300 mg total) by mouth 3 (three) times daily. for 10 days 30 capsule 0     No current facility-administered medications on file prior to visit.          Objective:      BP (!) 150/76   Pulse 97   Ht 5' 10" (1.778 m)   Wt 113 kg (249 lb 1.9 oz)   BMI 35.74 kg/m²   Physical Exam  General the patient is alert and oriented x3 no acute distress nontoxic-appearing appropriate affect.    Constitutional: Vital signs are examined and stable.  Resp: No signs of labored breathing             RLE: -Skin:No signs of infection no dehiscence, no drainage           -MSK: Hip and Knee F/E, EHL/FHL, Gastroc/Tib anterior Strength 5/5           -Neuro:  Sensation intact to light touch L3-S1 dermatomes           -Lymphatic: No signs of lymphadenopathy           -CV: Capillary refill is less than 2 seconds.            Body mass index is 35.74 kg/m².  Ideal body weight: 68.5 kg (151 lb 0.2 oz)  Adjusted ideal body weight: 86.3 kg (190 lb 4.1 oz)  No results found for: "HGBA1C"  Hgb   Date Value Ref Range Status   09/07/2024 10.3 (L) 12.0 - 16.0 g/dL Final   09/06/2024 11.0 (L) 12.0 - 16.0 g/dL Final     Hct   Date Value Ref Range Status   09/07/2024 32.0 (L) 37.0 - 47.0 % Final   09/06/2024 34.6 (L) 37.0 - 47.0 % Final     No results found for: "IRON"  No components found for: "FROLATE"  No results found for: "XBHCWQZX10TA"  WBC   Date Value Ref Range Status   09/07/2024 9.64 4.50 - 11.50 x10(3)/mcL Final   09/06/2024 12.54 (H) 4.50 - 11.50 x10(3)/mcL Final       Radiology:  3 view x ray tibia and fibula right: intramedullary nail in place. Stable " alignment distal tibia.  [place] evident.         Assessment:         1. Closed fracture of distal end of right tibia with routine healing, unspecified fracture morphology, subsequent encounter  X-Ray Ankle Complete Right              Plan:         No follow-ups on file.    Faith was seen today for follow-up.    Diagnoses and all orders for this visit:    Closed fracture of distal end of right tibia with routine healing, unspecified fracture morphology, subsequent encounter  -     X-Ray Ankle Complete Right; Future        Assessment & Plan    PLAN SUMMARY:  - Referred to physical therapy for rehabilitation and strengthening exercises  - Provided work note for the day  - Reviewed x-rays with patient, showing good healing progress of fracture    RETURN TO ACTIVITY:  - Provided work note for the day. Ms. Lee is able to walk but with a limp and some pain. Ms. Lee can go to the store but experiences some pain and swelling in the ankle.    REFERRALS:  - Referred to physical therapy for rehabilitation and strengthening exercises.    IMAGING ORDERS:  - Reviewed x-rays with the patient, showing good healing progress of the fracture.    PROCEDURES:  - Discussed the healing process of the tibia and fibula fractures. Explained the potential for permanent swelling in the ankle.        Pt doing very well in normal shoes today. Very happy with her care    This note was generated with the assistance of ambient listening technology. Verbal consent was obtained by the patient and accompanying visitor(s) for the recording of patient appointment to facilitate this note. I attest to having reviewed and edited the generated note for accuracy, though some syntax or spelling errors may persist. Please contact the author of this note for any clarification.              Future Appointments   Date Time Provider Department [place]   2/19/2025  8:00 AM Yifan Underwood DO [place] [facility]

## 2024-12-19 NOTE — LETTER
[facility]  [address] [place] [address]  Phone: [phone]  Fax: [phone]    Name:Faith Lee  :1990   Date:2024     The above mentioned patient was seen by me on 2024, and accompanied by Dami Desai. Please excuse absence.         If you should have any questions, please contact my office at [phone]       Yifan Underwood DO

## 2025-02-19 ENCOUNTER — OFFICE VISIT (OUTPATIENT)
Dept: ORTHOPEDICS | Facility: CLINIC | Age: 35
End: 2025-02-19
Payer: MEDICAID

## 2025-02-19 ENCOUNTER — HOSPITAL ENCOUNTER (OUTPATIENT)
Dept: RADIOLOGY | Facility: CLINIC | Age: 35
Discharge: HOME | End: 2025-02-19
Attending: ORTHOPAEDIC SURGERY
Payer: MEDICAID

## 2025-02-19 VITALS
SYSTOLIC BLOOD PRESSURE: 141 MMHG | HEART RATE: 80 BPM | WEIGHT: 249.13 LBS | BODY MASS INDEX: 35.66 KG/M2 | HEIGHT: 70 IN | DIASTOLIC BLOOD PRESSURE: 90 MMHG

## 2025-02-19 DIAGNOSIS — S82.301D CLOSED FRACTURE OF DISTAL END OF RIGHT TIBIA WITH ROUTINE HEALING, UNSPECIFIED FRACTURE MORPHOLOGY, SUBSEQUENT ENCOUNTER: Primary | ICD-10-CM

## 2025-02-19 DIAGNOSIS — S82.301D CLOSED FRACTURE OF DISTAL END OF RIGHT TIBIA WITH ROUTINE HEALING, UNSPECIFIED FRACTURE MORPHOLOGY, SUBSEQUENT ENCOUNTER: ICD-10-CM

## 2025-02-19 NOTE — PROGRESS NOTES
Subjective:       Patient ID: Faith Lee is a 35 y.o. female.  Chief Complaint   Patient presents with    Right Ankle - Follow-up     5.5 MONTH F/U FROM IMN RIGHT DISTAL TIBIA FX. AMBULATES WITHOUT ASSISTIVE DEVICES IN REGULAR SHOES. NO COMPLAINTS.         Follow-up        History of Present Illness    HPI:  Ms. Lee presents for follow-up approximately six months after ankle surgery. She reports ongoing swelling in the affected area, describing it as still significant. She is currently undergoing physical therapy to address the swelling and improve range of motion. She reports initial limitations in range of motion but notes improvement with therapy. Ms. Lee expresses surprise at the extended recovery time, having initially expected to feel better within three months. She also mentions that the screw from the surgical hardware is causing discomfort. She reports tenderness around the surgical scar, which is affecting her ability to use compression socks as recommended by the physical therapist.    Ms. Lee denies any breaks or injuries to other body parts.    PREVIOUS TREATMENTS:  Ms. Lee is currently undergoing physical therapy, which is providing moderate benefit in reducing swelling and improving range of motion.    IMAGING:  X-rays taken in September 2025 revealed that the bone has healed with a large callus formation. The plate and screws are in place, and the ankle joint looks good. The alignment is also good.      ROS:  Musculoskeletal: +joint swelling           ROS:  Constitutional: Denies fever chills  Eyes: No change in vision  ENT: No ringing or current infections  CV: No chest pain  Resp: No labored breathing  MSK: Pain evident at site of injury located in HPI,   Integ: No signs of abrasions or lacerations  Neuro: No numbness or tingling  Lymphatic: No swelling outside the area of injury     Current Outpatient Medications on File Prior to Visit   Medication Sig Dispense Refill    aspirin  "(ECOTRIN) 81 MG EC tablet Take 1 tablet (81 mg total) by mouth 2 (two) times a day. 60 tablet 0    gabapentin (NEURONTIN) 300 MG capsule Take 1 capsule (300 mg total) by mouth 3 (three) times daily. for 10 days 30 capsule 0     No current facility-administered medications on file prior to visit.          Objective:      BP (!) 141/90   Pulse 80   Ht 5' 10" (1.778 m)   Wt 113 kg (249 lb 1.9 oz)   BMI 35.74 kg/m²   Physical Exam  General the patient is alert and oriented x3 no acute distress nontoxic-appearing appropriate affect.    Constitutional: Vital signs are examined and stable.  Resp: No signs of labored breathing             RLE: -Skin:No signs of infection no dehiscence, no drainage           -MSK: Hip and Knee F/E, EHL/FHL, Gastroc/Tib anterior Strength 5/5           -Neuro:  Sensation intact to light touch L3-S1 dermatomes           -Lymphatic: No signs of lymphadenopathy           -CV: Capillary refill is less than 2 seconds.            Body mass index is 35.74 kg/m².  Ideal body weight: 68.5 kg (151 lb 0.2 oz)  Adjusted ideal body weight: 86.3 kg (190 lb 4.1 oz)  No results found for: "HGBA1C"  Hgb   Date Value Ref Range Status   09/07/2024 10.3 (L) 12.0 - 16.0 g/dL Final   09/06/2024 11.0 (L) 12.0 - 16.0 g/dL Final     Hct   Date Value Ref Range Status   09/07/2024 32.0 (L) 37.0 - 47.0 % Final   09/06/2024 34.6 (L) 37.0 - 47.0 % Final     No results found for: "IRON"  No components found for: "FROLATE"  No results found for: "[id number]"  WBC   Date Value Ref Range Status   09/07/2024 9.64 4.50 - 11.50 x10(3)/mcL Final   09/06/2024 12.54 (H) 4.50 - 11.50 x10(3)/mcL Final       Radiology:  3 view x ray tibia and fibula right: intramedullary nail in place. Stable alignment distal tibia.  Healed fracture        Assessment:         1. Closed fracture of distal end of right tibia with routine healing, unspecified fracture morphology, subsequent encounter  X-Ray Ankle Complete Right              Plan:    "      No follow-ups on file.    Faith was seen today for follow-up.    Diagnoses and all orders for this visit:    Closed fracture of distal end of right tibia with routine healing, unspecified fracture morphology, subsequent encounter  -     X-Ray Ankle Complete Right; Future        Assessment & Plan    PLAN SUMMARY:  - Continue home exercises  - Hardware removal not medically necessary unless causing problems  - Follow up in 3 months  - Discussed potential hardware removal, including option to remove only the painful screw  - Use compression socks as desired  - No restrictions on leg use    FOLLOW UP:  - Follow up in 3 months.    PROCEDURES:  - Discussed potential hardware removal. Removal is not medically necessary unless causing problems.  - Discussed possibility of removing just the screw that is causing pain.    PATIENT INSTRUCTIONS:  - Use compression socks as desired; no restrictions on leg use.  - Continue with home exercises.        Pt doing very well in normal shoes today. Very happy with her care.  She is still working with physical therapy.  She overall she states she is feeling well.  She has some sensitivity over her incision.  Continue weight-bearing as tolerated we discussed hardware removal although not necessary at this time.  She will follow up in 3 months    This note was generated with the assistance of ambient listening technology. Verbal consent was obtained by the patient and accompanying visitor(s) for the recording of patient appointment to facilitate this note. I attest to having reviewed and edited the generated note for accuracy, though some syntax or spelling errors may persist. Please contact the author of this note for any clarification.      This note/OR report was created with the assistance of  voice recognition software or phone  dictation.  There may be transcription errors as a result of using this technology however minimal. Effort has been made to assure accuracy of  transcription but any obvious errors or omissions should be clarified with the author of the document.       Yifan Underwood DO  Orthopedic Trauma Surgery         Future Appointments   Date Time Provider Department Center   5/20/2025  8:00 AM Yifan Underwood DO [facility]

## 2025-06-12 ENCOUNTER — TELEPHONE (OUTPATIENT)
Dept: ORTHOPEDICS | Facility: CLINIC | Age: 35
End: 2025-06-12
Payer: MEDICAID

## 2025-06-12 NOTE — TELEPHONE ENCOUNTER
Received VM that patient is experiencing increase pain and swelling. No showed last appointment. Recommend she continue to ice and elevate. If pain becomes uncontrolled she is to report to ER. Patient confirmed new appointment date and time.

## (undated) DEVICE — GLOVE PROTEXIS NEU-THERA SZ6

## (undated) DEVICE — BIT DRILL CANNULATED 3MM

## (undated) DEVICE — BIT DRILL 2.8

## (undated) DEVICE — GOWN SMARTGOWN 3XL XLONG

## (undated) DEVICE — DRESSING XEROFORM 5X9IN

## (undated) DEVICE — SUT CTD VICRYL CT-1 27

## (undated) DEVICE — ELECTRODE REM POLYHESIVE II

## (undated) DEVICE — GUIDEWIRE ORTHOPEDIC 220X1.6MM
Type: IMPLANTABLE DEVICE | Site: TIBIA | Status: NON-FUNCTIONAL
Removed: 2024-09-05

## (undated) DEVICE — TAPE SILK 3IN

## (undated) DEVICE — GLOVE SIGNATURE MICRO LTX 6

## (undated) DEVICE — BIT DRILL XLN 4.2MM

## (undated) DEVICE — APPLICATOR CHLORAPREP ORN 26ML

## (undated) DEVICE — COVER FULLGUARD SHOE HIGH-TOP

## (undated) DEVICE — GLOVE PROTEXIS HYDROGEL SZ9

## (undated) DEVICE — BIT BRILL 2.5

## (undated) DEVICE — WALKER TRACKER EX X-LARGE

## (undated) DEVICE — DRAPE C-ARMOR EQUIPMENT COVER

## (undated) DEVICE — STAPLER SKIN PROXIMATE WIDE

## (undated) DEVICE — DRAPE STERI U-SHAPED 47X51IN

## (undated) DEVICE — SLEEVE OTR PROTCT STRL 12MM

## (undated) DEVICE — BIT DRILL 4.2MM 3 FLUTD 145MM

## (undated) DEVICE — GLOVE PROTEXIS BLUE LATEX 9

## (undated) DEVICE — COVER TABLE HVY DTY 60X90IN

## (undated) DEVICE — DRAPE ORTH SPLIT 77X108IN

## (undated) DEVICE — WIRE GUIDE 3.2MM 400MM
Type: IMPLANTABLE DEVICE | Site: TIBIA | Status: NON-FUNCTIONAL
Removed: 2024-09-05

## (undated) DEVICE — Device